# Patient Record
Sex: FEMALE | Race: WHITE | Employment: UNEMPLOYED | URBAN - METROPOLITAN AREA
[De-identification: names, ages, dates, MRNs, and addresses within clinical notes are randomized per-mention and may not be internally consistent; named-entity substitution may affect disease eponyms.]

---

## 2018-06-17 PROBLEM — G93.40 ENCEPHALOPATHY ACUTE: Status: ACTIVE | Noted: 2018-06-17

## 2018-06-19 PROBLEM — R56.9 PARTIAL SEIZURE (HCC): Status: ACTIVE | Noted: 2018-06-19

## 2018-06-19 PROBLEM — G40.909 SEIZURE DISORDER (HCC): Status: ACTIVE | Noted: 2018-06-19

## 2018-06-19 PROBLEM — Z76.5 DRUG-SEEKING BEHAVIOR: Status: ACTIVE | Noted: 2018-06-19

## 2018-06-19 PROBLEM — G89.4 CHRONIC PAIN SYNDROME: Status: ACTIVE | Noted: 2018-06-19

## 2018-06-19 PROBLEM — G93.41 ACUTE METABOLIC ENCEPHALOPATHY: Status: ACTIVE | Noted: 2018-06-19

## 2018-06-19 PROBLEM — Z86.61 HISTORY OF MENINGITIS: Status: ACTIVE | Noted: 2018-06-19

## 2018-06-19 PROBLEM — F13.20 BENZODIAZEPINE DEPENDENCE, CONTINUOUS (HCC): Status: ACTIVE | Noted: 2018-06-19

## 2018-12-09 ENCOUNTER — APPOINTMENT (OUTPATIENT)
Dept: CT IMAGING | Age: 43
End: 2018-12-09
Payer: COMMERCIAL

## 2018-12-09 ENCOUNTER — APPOINTMENT (OUTPATIENT)
Dept: GENERAL RADIOLOGY | Age: 43
End: 2018-12-09
Payer: COMMERCIAL

## 2018-12-09 ENCOUNTER — HOSPITAL ENCOUNTER (EMERGENCY)
Age: 43
Discharge: OTHER INSTITUTION | End: 2018-12-09
Attending: EMERGENCY MEDICINE
Payer: COMMERCIAL

## 2018-12-09 VITALS
HEART RATE: 79 BPM | DIASTOLIC BLOOD PRESSURE: 48 MMHG | RESPIRATION RATE: 15 BRPM | TEMPERATURE: 96.8 F | SYSTOLIC BLOOD PRESSURE: 113 MMHG | OXYGEN SATURATION: 96 %

## 2018-12-09 DIAGNOSIS — S62.666A CLOSED NONDISPLACED FRACTURE OF DISTAL PHALANX OF RIGHT LITTLE FINGER, INITIAL ENCOUNTER: ICD-10-CM

## 2018-12-09 DIAGNOSIS — S61.411A LACERATION OF RIGHT HAND WITHOUT FOREIGN BODY, INITIAL ENCOUNTER: ICD-10-CM

## 2018-12-09 DIAGNOSIS — R56.9 SEIZURE (HCC): ICD-10-CM

## 2018-12-09 DIAGNOSIS — N30.00 ACUTE CYSTITIS WITHOUT HEMATURIA: ICD-10-CM

## 2018-12-09 DIAGNOSIS — G40.919 BREAKTHROUGH SEIZURE (HCC): ICD-10-CM
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
## 2018-12-09 DIAGNOSIS — R91.8 PULMONARY NODULES: ICD-10-CM

## 2018-12-09 DIAGNOSIS — I71.20 THORACIC AORTIC ANEURYSM WITHOUT RUPTURE: ICD-10-CM

## 2018-12-09 LAB
AMPHETAMINE SCREEN, URINE: ABNORMAL
ANION GAP SERPL CALCULATED.3IONS-SCNC: 12 MMOL/L (ref 3–16)
BACTERIA: ABNORMAL /HPF
BARBITURATE SCREEN URINE: ABNORMAL
BENZODIAZEPINE SCREEN, URINE: ABNORMAL
BILIRUBIN URINE: NEGATIVE
BLOOD, URINE: ABNORMAL
BUN BLDV-MCNC: 15 MG/DL (ref 7–20)
CALCIUM SERPL-MCNC: 9.1 MG/DL (ref 8.3–10.6)
CANNABINOID SCREEN URINE: POSITIVE
CHLORIDE BLD-SCNC: 106 MMOL/L (ref 99–110)
CLARITY: ABNORMAL
CO2: 20 MMOL/L (ref 21–32)
COCAINE METABOLITE SCREEN URINE: ABNORMAL
COLOR: YELLOW
CREAT SERPL-MCNC: 1 MG/DL (ref 0.6–1.1)
EPITHELIAL CELLS, UA: ABNORMAL /HPF
ETHANOL: NORMAL MG/DL (ref 0–0.08)
GFR AFRICAN AMERICAN: >60
GFR NON-AFRICAN AMERICAN: >60
GLUCOSE BLD-MCNC: 74 MG/DL (ref 70–99)
GLUCOSE URINE: NEGATIVE MG/DL
HCG(URINE) PREGNANCY TEST: NEGATIVE
HCT VFR BLD CALC: 42.9 % (ref 36–48)
HEMOGLOBIN: 14.1 G/DL (ref 12–16)
KEPPRA DOSE AMT: ABNORMAL
KEPPRA: <2 UG/ML (ref 6–46)
KETONES, URINE: NEGATIVE MG/DL
LEUKOCYTE ESTERASE, URINE: ABNORMAL
Lab: ABNORMAL
MCH RBC QN AUTO: 32.4 PG (ref 26–34)
MCHC RBC AUTO-ENTMCNC: 32.8 G/DL (ref 31–36)
MCV RBC AUTO: 98.6 FL (ref 80–100)
METHADONE SCREEN, URINE: ABNORMAL
MICROSCOPIC EXAMINATION: YES
NITRITE, URINE: NEGATIVE
OPIATE SCREEN URINE: ABNORMAL
OXYCODONE URINE: POSITIVE
PDW BLD-RTO: 14.9 % (ref 12.4–15.4)
PH UA: 5
PH UA: 5
PHENCYCLIDINE SCREEN URINE: ABNORMAL
PLATELET # BLD: 292 K/UL (ref 135–450)
PMV BLD AUTO: 7 FL (ref 5–10.5)
POTASSIUM SERPL-SCNC: 3.8 MMOL/L (ref 3.5–5.1)
PROPOXYPHENE SCREEN: ABNORMAL
PROTEIN UA: NEGATIVE MG/DL
RBC # BLD: 4.35 M/UL (ref 4–5.2)
RBC UA: ABNORMAL /HPF (ref 0–2)
SODIUM BLD-SCNC: 138 MMOL/L (ref 136–145)
SPECIFIC GRAVITY UA: 1.01
URINE TYPE: ABNORMAL
UROBILINOGEN, URINE: 0.2 E.U./DL
WBC # BLD: 10.9 K/UL (ref 4–11)
WBC UA: ABNORMAL /HPF (ref 0–5)

## 2018-12-09 PROCEDURE — 71260 CT THORAX DX C+: CPT

## 2018-12-09 PROCEDURE — 80048 BASIC METABOLIC PNL TOTAL CA: CPT

## 2018-12-09 PROCEDURE — 80177 DRUG SCRN QUAN LEVETIRACETAM: CPT

## 2018-12-09 PROCEDURE — 96374 THER/PROPH/DIAG INJ IV PUSH: CPT

## 2018-12-09 PROCEDURE — 80307 DRUG TEST PRSMV CHEM ANLYZR: CPT

## 2018-12-09 PROCEDURE — 81001 URINALYSIS AUTO W/SCOPE: CPT

## 2018-12-09 PROCEDURE — 74177 CT ABD & PELVIS W/CONTRAST: CPT

## 2018-12-09 PROCEDURE — 72125 CT NECK SPINE W/O DYE: CPT

## 2018-12-09 PROCEDURE — 85027 COMPLETE CBC AUTOMATED: CPT

## 2018-12-09 PROCEDURE — 36415 COLL VENOUS BLD VENIPUNCTURE: CPT

## 2018-12-09 PROCEDURE — 93005 ELECTROCARDIOGRAM TRACING: CPT | Performed by: NURSE PRACTITIONER

## 2018-12-09 PROCEDURE — 73130 X-RAY EXAM OF HAND: CPT

## 2018-12-09 PROCEDURE — 70450 CT HEAD/BRAIN W/O DYE: CPT

## 2018-12-09 PROCEDURE — 6360000002 HC RX W HCPCS

## 2018-12-09 PROCEDURE — 6360000002 HC RX W HCPCS: Performed by: EMERGENCY MEDICINE

## 2018-12-09 PROCEDURE — 6360000004 HC RX CONTRAST MEDICATION: Performed by: EMERGENCY MEDICINE

## 2018-12-09 PROCEDURE — 84703 CHORIONIC GONADOTROPIN ASSAY: CPT

## 2018-12-09 PROCEDURE — G0480 DRUG TEST DEF 1-7 CLASSES: HCPCS

## 2018-12-09 PROCEDURE — 99285 EMERGENCY DEPT VISIT HI MDM: CPT

## 2018-12-09 RX ORDER — LORAZEPAM 2 MG/ML
INJECTION INTRAMUSCULAR
Status: COMPLETED
Start: 2018-12-09 | End: 2018-12-09

## 2018-12-09 RX ORDER — CIPROFLOXACIN 2 MG/ML
400 INJECTION, SOLUTION INTRAVENOUS ONCE
Status: COMPLETED | OUTPATIENT
Start: 2018-12-09 | End: 2018-12-09

## 2018-12-09 RX ORDER — MORPHINE SULFATE 4 MG/ML
4 INJECTION, SOLUTION INTRAMUSCULAR; INTRAVENOUS EVERY 30 MIN PRN
Status: DISCONTINUED | OUTPATIENT
Start: 2018-12-09 | End: 2018-12-09 | Stop reason: HOSPADM

## 2018-12-09 RX ADMIN — IOPAMIDOL 75 ML: 755 INJECTION, SOLUTION INTRAVENOUS at 06:27

## 2018-12-09 RX ADMIN — MORPHINE SULFATE 4 MG: 4 INJECTION INTRAVENOUS at 06:00

## 2018-12-09 RX ADMIN — LORAZEPAM 1 MG: 2 INJECTION, SOLUTION INTRAMUSCULAR; INTRAVENOUS at 02:30

## 2018-12-09 RX ADMIN — MORPHINE SULFATE 4 MG: 4 INJECTION INTRAVENOUS at 06:39

## 2018-12-09 RX ADMIN — CIPROFLOXACIN 400 MG: 2 INJECTION, SOLUTION INTRAVENOUS at 07:46

## 2018-12-09 ASSESSMENT — PAIN SCALES - GENERAL
PAINLEVEL_OUTOF10: 10
PAINLEVEL_OUTOF10: 9

## 2018-12-09 NOTE — ED PROVIDER NOTES
History Narrative    No narrative on file     Current Facility-Administered Medications   Medication Dose Route Frequency Provider Last Rate Last Dose    morphine (PF) injection 4 mg  4 mg Intravenous Q30 Min PRN [place] Sites, DO   4 mg at 12/09/18 7586    ciprofloxacin (CIPRO) IVPB 400 mg  400 mg Intravenous Once [place] Sites, DO         Current Outpatient Prescriptions   Medication Sig Dispense Refill    naproxen (NAPROSYN) 500 MG tablet Take 1 tablet by mouth 2 times daily as needed for Pain 20 tablet 0    citalopram (CELEXA) 10 MG tablet Take 10 mg by mouth daily      topiramate (TOPAMAX) 50 MG tablet Take 100 mg by mouth 2 times daily       alprazolam (XANAX) 0.25 MG tablet Take 0.25 mg by mouth 3 times daily as needed.  gabapentin (NEURONTIN) 800 MG tablet Take 600 mg by mouth 3 times daily.  lamotrigine (LAMICTAL) 200 MG tablet Take 200 mg by mouth See Admin Instructions 200 mg in am , 250 mg at night       Allergies   Allergen Reactions    Fioricet [Butalbital-Apap-Caffeine]      \"CANT BREATHE\"    Phenytoin Sodium Extended      DOESN'T REMEMBER REACTION    Reglan [Metoclopramide] Hives and Itching    Toradol [Ketorolac Tromethamine]      Pt states it makes the headache pain worse rather than better    Erythromycin Rash    Penicillins Rash       REVIEW OF SYSTEMS  Review of Systems  10 systems reviewed, pertinent positives perHPI otherwise noted to be negative. PHYSICAL EXAM  /71   Pulse 80   Temp 96.8 °F (36 °C) (Oral)   Resp 13   SpO2 99%     GENERAL APPEARANCE: very anxious must be redirected multiple times no acute distress, nontoxic and non-ill-appearing, fully developed well-nourished adult appears discheveled and older then stated age. HEAD: Normocephalic. Atraumatic. EYES: EOM's grossly intact. ENT: Mucous membranes are moist.   NECK: Supple. Full Range of motion  HEART: RRR. No murmurs  LUNGS:  Lungs are cta. ABDOMEN: Soft. Non-distended.  Normal bowel sounds. No organomegaly. Non-tender  EXTREMITIES: No peripheral edema. Moves all extremities equally. No gross deformities of the upperor lower extremities. Small abrasion to right middle finger no active bleeding  SKIN: Warm and dry. No acute rashes. NEUROLOGICAL: Alert and oriented. Cranial nerves II-12 are  intact, no focal neurologic deficits . PSYCHIATRIC: Normal mood and affect. Physical Exam    LABS  I have reviewed all labs for this visit.    Results for orders placed or performed during the hospital encounter of 12/09/18   CBC   Result Value Ref Range    WBC 10.9 4.0 - 11.0 K/uL    RBC 4.35 4.00 - 5.20 M/uL    Hemoglobin 14.1 12.0 - 16.0 g/dL    Hematocrit 42.9 36.0 - 48.0 %    MCV 98.6 80.0 - 100.0 fL    MCH 32.4 26.0 - 34.0 pg    MCHC 32.8 31.0 - 36.0 g/dL    RDW 14.9 12.4 - 15.4 %    Platelets 241 229 - 481 K/uL    MPV 7.0 5.0 - 10.5 fL   Basic Metabolic Panel   Result Value Ref Range    Sodium 138 136 - 145 mmol/L    Potassium 3.8 3.5 - 5.1 mmol/L    Chloride 106 99 - 110 mmol/L    CO2 20 (L) 21 - 32 mmol/L    Anion Gap 12 3 - 16    Glucose 74 70 - 99 mg/dL    BUN 15 7 - 20 mg/dL    CREATININE 1.0 0.6 - 1.1 mg/dL    GFR Non-African American >60 >60    GFR African American >60 >60    Calcium 9.1 8.3 - 10.6 mg/dL   Ethanol   Result Value Ref Range    Ethanol Lvl None Detected mg/dL   Levetiracetam Level   Result Value Ref Range    KEPPRA Dose Amt Unknown    Pregnancy, Urine   Result Value Ref Range    HCG(Urine) Pregnancy Test Negative Detects HCG level >20 MIU/mL   Urine Drug Screen   Result Value Ref Range    Amphetamine Screen, Urine Neg Negative <1000ng/mL    Barbiturate Screen, Ur Neg Negative <200 ng/mL    Benzodiazepine Screen, Urine Neg Negative <200 ng/mL    Cannabinoid Scrn, Ur POSITIVE (A) Negative <50 ng/mL    Cocaine Metabolite Screen, Urine Neg Negative <300 ng/mL    Opiate Scrn, Ur Neg Negative <300 ng/mL    PCP Screen, Urine Neg Negative <25 ng/mL    Methadone Screen, Urine Neg Negative <300 ng/mL    Propoxyphene Scrn, Ur Neg Negative <300 ng/mL    pH, UA 5.0     Drug Screen Comment: see below     Oxycodone Urine POSITIVE (A) Negative <100 ng/ml   Urinalysis   Result Value Ref Range    Color, UA YELLOW Straw/Yellow    Clarity, UA CLOUDY (A) Clear    Glucose, Ur Negative Negative mg/dL    Bilirubin Urine Negative Negative    Ketones, Urine Negative Negative mg/dL    Specific Gravity, UA 1.010 1.005 - 1.030    Blood, Urine TRACE (A) Negative    pH, UA 5.0 5.0 - 8.0    Protein, UA Negative Negative mg/dL    Urobilinogen, Urine 0.2 <2.0 E.U./dL    Nitrite, Urine Negative Negative    Leukocyte Esterase, Urine LARGE (A) Negative    Microscopic Examination YES     Urine Type Not Specified    Microscopic Urinalysis   Result Value Ref Range    WBC, UA 3-5 0 - 5 /HPF    RBC, UA 0-2 0 - 2 /HPF    Epi Cells 5-10 /HPF    Bacteria, UA Rare (A) /HPF       EKG  [unfilled]    RADIOLOGY  XR HAND RIGHT (MIN 3 VIEWS)   Final Result   Fracture of the distal phalanx of the right 4th digit. CT CHEST W CONTRAST   Final Result   No evidence of acute traumatic injury. Aneurysmal dilatation of the ascending aorta to approximately 4 cm. Emphysema. A few small noncalcified pulmonary nodules are also seen   measuring up to approximately 5 mm. Follow-up recommendations are as below. RECOMMENDATIONS:   Fleischner Society guidelines for follow-up and management of incidentally   detected pulmonary nodules:      Single Solid Nodule:      Nodule size less than 6 mm   In a low-risk patient, no routine follow-up. In a high-risk patient, optional CT at 12 months. Nodule size equals 6-8 mm   In a low-risk patient, CT at 6-12 months, then consider CT at 18-24 months. In a high-risk patient, CT at 6-12 months, then CT at 18-24 months. Nodule size greater than 8 mm         In a low-risk patient, consider CT at 3 months, PET/CT, or tissue sampling.       In a high-risk patient, consider CT at 3 months, PET/CT, or tissue sampling. Multiple Solid Nodules:      Nodule size less than 6 mm   In a low-risk patient, no routine follow-up. In a high-risk patient, optional CT at 12 months. Nodule size equals 6-8 mm   In a low-risk patient, CT at 3-6 months, then consider CT at 18-24 months. In a high-risk patient, CT at 3-6 months, then CT at 18-24 months. Nodule size greater than 8 mm   In a low-risk patient, CT at 3-6 months, then consider CT at 18-24 months. In a high-risk patient, CT at 3-6 months, then CT at 18-24 months. - Low risk patients include individuals with minimal or absent history of   smoking and other known risk factors. - High risk patients include individuals with a history or smoking or known   risk factors. Radiology 2017 http://pubs. rsna.org/doi/full/10.1148/radiol. 3300289839         CT ABDOMEN PELVIS W IV CONTRAST Additional Contrast? None   Final Result   No evidence of acute traumatic injury. Aneurysmal dilatation of the ascending aorta to approximately 4 cm. Emphysema. A few small noncalcified pulmonary nodules are also seen   measuring up to approximately 5 mm. Follow-up recommendations are as below. RECOMMENDATIONS:   Fleischner Society guidelines for follow-up and management of incidentally   detected pulmonary nodules:      Single Solid Nodule:      Nodule size less than 6 mm   In a low-risk patient, no routine follow-up. In a high-risk patient, optional CT at 12 months. Nodule size equals 6-8 mm   In a low-risk patient, CT at 6-12 months, then consider CT at 18-24 months. In a high-risk patient, CT at 6-12 months, then CT at 18-24 months. Nodule size greater than 8 mm         In a low-risk patient, consider CT at 3 months, PET/CT, or tissue sampling. In a high-risk patient, consider CT at 3 months, PET/CT, or tissue sampling.       Multiple Solid Nodules:      Nodule size less than 6 mm   In a low-risk patient, no routine follow-up. In a high-risk patient, optional CT at 12 months. Nodule size equals 6-8 mm   In a low-risk patient, CT at 3-6 months, then consider CT at 18-24 months. In a high-risk patient, CT at 3-6 months, then CT at 18-24 months. Nodule size greater than 8 mm   In a low-risk patient, CT at 3-6 months, then consider CT at 18-24 months. In a high-risk patient, CT at 3-6 months, then CT at 18-24 months. - Low risk patients include individuals with minimal or absent history of   smoking and other known risk factors. - High risk patients include individuals with a history or smoking or known   risk factors. Radiology 2017 http://pubs. rsna.org/doi/full/10.1148/radiol. 3879845566         CT HEAD WO CONTRAST    (Results Pending)   CT CERVICAL SPINE WO CONTRAST    (Results Pending)           PROCEDURES    ED COURSE/MDM  Patient was triaged, vital signs are taken and EKG was performed that shows a normal sinus rhythm there is no acute ST elevation acute T-wave inversion rate of 99  QRS 74  QTc of 454. Compared to prior EKG August 2018 that showed similar findings. Patient had CT imaging performed of the head neck chest abdomen pelvis that she had tenderness with palpation throughout the thoracic spine  She is found to be positive for cannabis and opiates on urine drug screen. She was given 1 mg of Ativan to prevent further seizures and secondary to severe anxiety. She was started on medications for pain and symptom control. She signed out of that a aneurysm of the descending thoracic aorta. She is discussed with the on-call trauma team at [facility] in the emergency department physician Dr. Felisha Brock and agreed to accept the patient in transfer which is otherwise arranged she started on antibiotics for urinary tract infection. Finger fracture was noted and the patient was placed into a splint. Old records reviewed.  Labs and imaging reviewed and results

## 2018-12-09 NOTE — ED NOTES
Bed: 05  Expected date:   Expected time:   Means of arrival:   Comments:  FF [address] [facility], [facility]  12/09/18 9417

## 2018-12-09 NOTE — ED NOTES
Pt placed in a [place] collar. Pt verbalizes understanding of necessity. Pt remains tearful.       Micky Jara RN  12/09/18 4799

## 2018-12-10 LAB
EKG ATRIAL RATE: 99 BPM
EKG DIAGNOSIS: NORMAL
EKG P AXIS: 76 DEGREES
EKG P-R INTERVAL: 148 MS
EKG Q-T INTERVAL: 354 MS
EKG QRS DURATION: 74 MS
EKG QTC CALCULATION (BAZETT): 454 MS
EKG R AXIS: 85 DEGREES
EKG T AXIS: 59 DEGREES
EKG VENTRICULAR RATE: 99 BPM

## 2018-12-10 PROCEDURE — 93010 ELECTROCARDIOGRAM REPORT: CPT | Performed by: INTERNAL MEDICINE

## 2019-02-25 ENCOUNTER — HOSPITAL ENCOUNTER (EMERGENCY)
Age: 44
Discharge: HOME | End: 2019-02-25
Payer: MEDICAID

## 2019-02-25 VITALS
WEIGHT: 120 LBS | HEIGHT: 60 IN | BODY MASS INDEX: 23.56 KG/M2 | OXYGEN SATURATION: 97 % | RESPIRATION RATE: 20 BRPM | DIASTOLIC BLOOD PRESSURE: 82 MMHG | TEMPERATURE: 98.2 F | HEART RATE: 95 BPM | SYSTOLIC BLOOD PRESSURE: 108 MMHG

## 2019-02-25 DIAGNOSIS — F41.0 PANIC ATTACK: Primary | ICD-10-CM

## 2019-02-25 PROCEDURE — 99283 EMERGENCY DEPT VISIT LOW MDM: CPT

## 2019-02-25 PROCEDURE — 6370000000 HC RX 637 (ALT 250 FOR IP): Performed by: NURSE PRACTITIONER

## 2019-02-25 RX ORDER — LORAZEPAM 1 MG/1
1 TABLET ORAL ONCE
Status: COMPLETED | OUTPATIENT
Start: 2019-02-25 | End: 2019-02-25

## 2019-02-25 RX ORDER — NAPROXEN 250 MG/1
500 TABLET ORAL ONCE
Status: COMPLETED | OUTPATIENT
Start: 2019-02-25 | End: 2019-02-25

## 2019-02-25 RX ORDER — HYDROXYZINE PAMOATE 25 MG/1
50 CAPSULE ORAL ONCE
Status: COMPLETED | OUTPATIENT
Start: 2019-02-25 | End: 2019-02-25

## 2019-02-25 RX ORDER — KETOROLAC TROMETHAMINE 30 MG/ML
30 INJECTION, SOLUTION INTRAMUSCULAR; INTRAVENOUS ONCE
Status: DISCONTINUED | OUTPATIENT
Start: 2019-02-25 | End: 2019-02-25

## 2019-02-25 RX ORDER — ACETAMINOPHEN 500 MG
1000 TABLET ORAL ONCE
Status: DISCONTINUED | OUTPATIENT
Start: 2019-02-25 | End: 2019-02-25

## 2019-02-25 RX ORDER — HYDROCODONE BITARTRATE AND ACETAMINOPHEN 7.5; 325 MG/1; MG/1
1 TABLET ORAL 3 TIMES DAILY
COMMUNITY

## 2019-02-25 RX ADMIN — NAPROXEN 500 MG: 250 TABLET ORAL at 18:05

## 2019-02-25 RX ADMIN — HYDROXYZINE PAMOATE 50 MG: 25 CAPSULE ORAL at 14:52

## 2019-02-25 RX ADMIN — LORAZEPAM 1 MG: 1 TABLET ORAL at 14:52

## 2019-02-25 ASSESSMENT — ENCOUNTER SYMPTOMS
NAUSEA: 0
SHORTNESS OF BREATH: 0
CONSTIPATION: 0
ABDOMINAL PAIN: 0
VOMITING: 0
SORE THROAT: 0
BLOOD IN STOOL: 0
DIARRHEA: 0
RHINORRHEA: 0

## 2019-02-25 ASSESSMENT — PAIN SCALES - GENERAL
PAINLEVEL_OUTOF10: 6
PAINLEVEL_OUTOF10: 6

## 2019-02-25 ASSESSMENT — PAIN DESCRIPTION - LOCATION
LOCATION: HEAD
LOCATION: HEAD

## 2019-02-25 ASSESSMENT — PAIN DESCRIPTION - PAIN TYPE: TYPE: CHRONIC PAIN

## 2019-04-30 ENCOUNTER — APPOINTMENT (OUTPATIENT)
Dept: GENERAL RADIOLOGY | Age: 44
End: 2019-04-30
Payer: COMMERCIAL

## 2019-04-30 ENCOUNTER — APPOINTMENT (OUTPATIENT)
Dept: CT IMAGING | Age: 44
End: 2019-04-30
Payer: COMMERCIAL

## 2019-04-30 ENCOUNTER — HOSPITAL ENCOUNTER (OUTPATIENT)
Age: 44
Setting detail: OBSERVATION
Discharge: HOME | End: 2019-05-02
Attending: EMERGENCY MEDICINE | Admitting: HOSPITALIST
Payer: COMMERCIAL

## 2019-04-30 DIAGNOSIS — F19.10 POLYSUBSTANCE ABUSE (HCC): Primary | ICD-10-CM

## 2019-04-30 DIAGNOSIS — E87.20 METABOLIC ACIDOSIS: ICD-10-CM

## 2019-04-30 DIAGNOSIS — R45.851 SUICIDAL IDEATION: ICD-10-CM

## 2019-04-30 DIAGNOSIS — F19.951 HALLUCINATION, DRUG-INDUCED (HCC): ICD-10-CM

## 2019-04-30 DIAGNOSIS — E16.2 HYPOGLYCEMIA: ICD-10-CM

## 2019-04-30 DIAGNOSIS — R40.4 TRANSIENT ALTERATION OF AWARENESS: ICD-10-CM

## 2019-04-30 PROBLEM — F15.929 METHAMPHETAMINE INTOXICATION (HCC): Status: ACTIVE | Noted: 2019-04-30

## 2019-04-30 LAB
A/G RATIO: 0.9 (ref 1.1–2.2)
ACETAMINOPHEN LEVEL: <5 UG/ML (ref 10–30)
ALBUMIN SERPL-MCNC: 3.3 G/DL (ref 3.4–5)
ALP BLD-CCNC: 109 U/L (ref 40–129)
ALT SERPL-CCNC: 21 U/L (ref 10–40)
AMPHETAMINE SCREEN, URINE: POSITIVE
ANION GAP SERPL CALCULATED.3IONS-SCNC: 12 MMOL/L (ref 3–16)
ANION GAP SERPL CALCULATED.3IONS-SCNC: 18 MMOL/L (ref 3–16)
AST SERPL-CCNC: 23 U/L (ref 15–37)
BACTERIA: ABNORMAL /HPF
BARBITURATE SCREEN URINE: ABNORMAL
BASE EXCESS VENOUS: -5.6 MMOL/L (ref -3–3)
BASOPHILS ABSOLUTE: 0 K/UL (ref 0–0.2)
BASOPHILS RELATIVE PERCENT: 0.5 %
BENZODIAZEPINE SCREEN, URINE: ABNORMAL
BILIRUB SERPL-MCNC: 1.8 MG/DL (ref 0–1)
BILIRUBIN URINE: ABNORMAL
BLOOD, URINE: NEGATIVE
BUN BLDV-MCNC: 12 MG/DL (ref 7–20)
BUN BLDV-MCNC: 14 MG/DL (ref 7–20)
CALCIUM SERPL-MCNC: 7.6 MG/DL (ref 8.3–10.6)
CALCIUM SERPL-MCNC: 8.2 MG/DL (ref 8.3–10.6)
CANNABINOID SCREEN URINE: ABNORMAL
CARBOXYHEMOGLOBIN: 4.7 % (ref 0–1.5)
CHLORIDE BLD-SCNC: 105 MMOL/L (ref 99–110)
CHLORIDE BLD-SCNC: 111 MMOL/L (ref 99–110)
CHP ED QC CHECK: YES
CHP ED QC CHECK: YES
CLARITY: ABNORMAL
CO2: 14 MMOL/L (ref 21–32)
CO2: 16 MMOL/L (ref 21–32)
COCAINE METABOLITE SCREEN URINE: POSITIVE
COLOR: YELLOW
CREAT SERPL-MCNC: 0.7 MG/DL (ref 0.6–1.1)
CREAT SERPL-MCNC: 1 MG/DL (ref 0.6–1.1)
EOSINOPHILS ABSOLUTE: 0 K/UL (ref 0–0.6)
EOSINOPHILS RELATIVE PERCENT: 0.1 %
EPITHELIAL CELLS, UA: 7 /HPF (ref 0–5)
ETHANOL: NORMAL MG/DL (ref 0–0.08)
GFR AFRICAN AMERICAN: >60
GFR AFRICAN AMERICAN: >60
GFR NON-AFRICAN AMERICAN: >60
GFR NON-AFRICAN AMERICAN: >60
GLOBULIN: 3.5 G/DL
GLUCOSE BLD-MCNC: 118 MG/DL
GLUCOSE BLD-MCNC: 118 MG/DL (ref 70–99)
GLUCOSE BLD-MCNC: 58 MG/DL
GLUCOSE BLD-MCNC: 58 MG/DL (ref 70–99)
GLUCOSE BLD-MCNC: 63 MG/DL (ref 70–99)
GLUCOSE BLD-MCNC: 64 MG/DL (ref 70–99)
GLUCOSE BLD-MCNC: 68 MG/DL (ref 70–99)
GLUCOSE BLD-MCNC: 81 MG/DL (ref 70–99)
GLUCOSE BLD-MCNC: 99 MG/DL (ref 70–99)
GLUCOSE URINE: NEGATIVE MG/DL
HCG QUALITATIVE: NEGATIVE
HCG(URINE) PREGNANCY TEST: NEGATIVE
HCO3 VENOUS: 15.8 MMOL/L (ref 23–29)
HCT VFR BLD CALC: 34.7 % (ref 36–48)
HEMOGLOBIN: 11.5 G/DL (ref 12–16)
HYALINE CASTS: 10 /LPF (ref 0–8)
KETONES, URINE: 15 MG/DL
LEUKOCYTE ESTERASE, URINE: NEGATIVE
LYMPHOCYTES ABSOLUTE: 1.6 K/UL (ref 1–5.1)
LYMPHOCYTES RELATIVE PERCENT: 18.1 %
Lab: ABNORMAL
MAGNESIUM: 2 MG/DL (ref 1.8–2.4)
MCH RBC QN AUTO: 32.9 PG (ref 26–34)
MCHC RBC AUTO-ENTMCNC: 33.2 G/DL (ref 31–36)
MCV RBC AUTO: 99.2 FL (ref 80–100)
METHADONE SCREEN, URINE: ABNORMAL
METHEMOGLOBIN VENOUS: 0.3 %
MICROSCOPIC EXAMINATION: YES
MONOCYTES ABSOLUTE: 0.8 K/UL (ref 0–1.3)
MONOCYTES RELATIVE PERCENT: 9 %
NEUTROPHILS ABSOLUTE: 6.3 K/UL (ref 1.7–7.7)
NEUTROPHILS RELATIVE PERCENT: 72.3 %
NITRITE, URINE: NEGATIVE
O2 CONTENT, VEN: 15 VOL %
O2 SAT, VEN: 100 %
O2 THERAPY: ABNORMAL
OPIATE SCREEN URINE: ABNORMAL
OXYCODONE URINE: POSITIVE
PCO2, VEN: 19.9 MMHG (ref 40–50)
PDW BLD-RTO: 21.8 % (ref 12.4–15.4)
PERFORMED ON: ABNORMAL
PERFORMED ON: NORMAL
PERFORMED ON: NORMAL
PH UA: 5.5
PH UA: 5.5 (ref 5–8)
PH VENOUS: 7.51 (ref 7.35–7.45)
PHENCYCLIDINE SCREEN URINE: ABNORMAL
PLATELET # BLD: 337 K/UL (ref 135–450)
PMV BLD AUTO: 7.7 FL (ref 5–10.5)
PO2, VEN: 142 MMHG (ref 25–40)
POTASSIUM REFLEX MAGNESIUM: 3.4 MMOL/L (ref 3.5–5.1)
POTASSIUM REFLEX MAGNESIUM: 3.6 MMOL/L (ref 3.5–5.1)
PROPOXYPHENE SCREEN: ABNORMAL
PROTEIN UA: 100 MG/DL
RBC # BLD: 3.5 M/UL (ref 4–5.2)
RBC UA: 2 /HPF (ref 0–4)
SALICYLATE, SERUM: 8.8 MG/DL (ref 15–30)
SODIUM BLD-SCNC: 137 MMOL/L (ref 136–145)
SODIUM BLD-SCNC: 139 MMOL/L (ref 136–145)
SPECIFIC GRAVITY UA: 1.01 (ref 1–1.03)
TCO2 CALC VENOUS: 37 MMOL/L
TOTAL CK: 96 U/L (ref 26–192)
TOTAL PROTEIN: 6.8 G/DL (ref 6.4–8.2)
URINE REFLEX TO CULTURE: YES
URINE TYPE: ABNORMAL
UROBILINOGEN, URINE: 1 E.U./DL
WBC # BLD: 8.7 K/UL (ref 4–11)
WBC UA: 13 /HPF (ref 0–5)

## 2019-04-30 PROCEDURE — 85025 COMPLETE CBC W/AUTO DIFF WBC: CPT

## 2019-04-30 PROCEDURE — 2060000000 HC ICU INTERMEDIATE R&B

## 2019-04-30 PROCEDURE — 6360000002 HC RX W HCPCS: Performed by: EMERGENCY MEDICINE

## 2019-04-30 PROCEDURE — 96375 TX/PRO/DX INJ NEW DRUG ADDON: CPT

## 2019-04-30 PROCEDURE — 6370000000 HC RX 637 (ALT 250 FOR IP): Performed by: HOSPITALIST

## 2019-04-30 PROCEDURE — 71045 X-RAY EXAM CHEST 1 VIEW: CPT

## 2019-04-30 PROCEDURE — 80307 DRUG TEST PRSMV CHEM ANLYZR: CPT

## 2019-04-30 PROCEDURE — G0378 HOSPITAL OBSERVATION PER HR: HCPCS

## 2019-04-30 PROCEDURE — G0480 DRUG TEST DEF 1-7 CLASSES: HCPCS

## 2019-04-30 PROCEDURE — 99285 EMERGENCY DEPT VISIT HI MDM: CPT

## 2019-04-30 PROCEDURE — 6360000002 HC RX W HCPCS: Performed by: HOSPITALIST

## 2019-04-30 PROCEDURE — 87086 URINE CULTURE/COLONY COUNT: CPT

## 2019-04-30 PROCEDURE — 96376 TX/PRO/DX INJ SAME DRUG ADON: CPT

## 2019-04-30 PROCEDURE — 82550 ASSAY OF CK (CPK): CPT

## 2019-04-30 PROCEDURE — 96361 HYDRATE IV INFUSION ADD-ON: CPT

## 2019-04-30 PROCEDURE — 2580000003 HC RX 258: Performed by: EMERGENCY MEDICINE

## 2019-04-30 PROCEDURE — 93005 ELECTROCARDIOGRAM TRACING: CPT | Performed by: NURSE PRACTITIONER

## 2019-04-30 PROCEDURE — 96374 THER/PROPH/DIAG INJ IV PUSH: CPT

## 2019-04-30 PROCEDURE — 6360000002 HC RX W HCPCS: Performed by: NURSE PRACTITIONER

## 2019-04-30 PROCEDURE — 81001 URINALYSIS AUTO W/SCOPE: CPT

## 2019-04-30 PROCEDURE — 70450 CT HEAD/BRAIN W/O DYE: CPT

## 2019-04-30 PROCEDURE — 2580000003 HC RX 258: Performed by: HOSPITALIST

## 2019-04-30 PROCEDURE — 96366 THER/PROPH/DIAG IV INF ADDON: CPT

## 2019-04-30 PROCEDURE — 36415 COLL VENOUS BLD VENIPUNCTURE: CPT

## 2019-04-30 PROCEDURE — 83735 ASSAY OF MAGNESIUM: CPT

## 2019-04-30 PROCEDURE — 2500000003 HC RX 250 WO HCPCS: Performed by: HOSPITALIST

## 2019-04-30 PROCEDURE — 84703 CHORIONIC GONADOTROPIN ASSAY: CPT

## 2019-04-30 PROCEDURE — 2580000003 HC RX 258: Performed by: NURSE PRACTITIONER

## 2019-04-30 PROCEDURE — 80053 COMPREHEN METABOLIC PANEL: CPT

## 2019-04-30 PROCEDURE — 82803 BLOOD GASES ANY COMBINATION: CPT

## 2019-04-30 PROCEDURE — 96365 THER/PROPH/DIAG IV INF INIT: CPT

## 2019-04-30 RX ORDER — 0.9 % SODIUM CHLORIDE 0.9 %
1000 INTRAVENOUS SOLUTION INTRAVENOUS ONCE
Status: DISCONTINUED | OUTPATIENT
Start: 2019-04-30 | End: 2019-04-30

## 2019-04-30 RX ORDER — LORAZEPAM 2 MG/ML
1 INJECTION INTRAMUSCULAR ONCE
Status: COMPLETED | OUTPATIENT
Start: 2019-04-30 | End: 2019-04-30

## 2019-04-30 RX ORDER — SODIUM CHLORIDE, SODIUM LACTATE, POTASSIUM CHLORIDE, CALCIUM CHLORIDE 600; 310; 30; 20 MG/100ML; MG/100ML; MG/100ML; MG/100ML
INJECTION, SOLUTION INTRAVENOUS ONCE
Status: COMPLETED | OUTPATIENT
Start: 2019-04-30 | End: 2019-04-30

## 2019-04-30 RX ORDER — 0.9 % SODIUM CHLORIDE 0.9 %
1000 INTRAVENOUS SOLUTION INTRAVENOUS ONCE
Status: COMPLETED | OUTPATIENT
Start: 2019-04-30 | End: 2019-04-30

## 2019-04-30 RX ORDER — ONDANSETRON 2 MG/ML
4 INJECTION INTRAMUSCULAR; INTRAVENOUS EVERY 6 HOURS PRN
Status: DISCONTINUED | OUTPATIENT
Start: 2019-04-30 | End: 2019-05-02 | Stop reason: HOSPADM

## 2019-04-30 RX ORDER — HYDROCODONE BITARTRATE AND ACETAMINOPHEN 7.5; 325 MG/1; MG/1
1 TABLET ORAL 3 TIMES DAILY
Status: DISCONTINUED | OUTPATIENT
Start: 2019-04-30 | End: 2019-05-02 | Stop reason: HOSPADM

## 2019-04-30 RX ORDER — ALPRAZOLAM 0.25 MG/1
0.25 TABLET ORAL 3 TIMES DAILY PRN
Status: DISCONTINUED | OUTPATIENT
Start: 2019-04-30 | End: 2019-05-02 | Stop reason: HOSPADM

## 2019-04-30 RX ORDER — SODIUM CHLORIDE 0.9 % (FLUSH) 0.9 %
10 SYRINGE (ML) INJECTION EVERY 12 HOURS SCHEDULED
Status: DISCONTINUED | OUTPATIENT
Start: 2019-04-30 | End: 2019-05-02 | Stop reason: HOSPADM

## 2019-04-30 RX ORDER — CYCLOBENZAPRINE HCL 10 MG
TABLET ORAL
Refills: 1 | COMMUNITY
Start: 2019-04-29

## 2019-04-30 RX ORDER — BUTORPHANOL TARTRATE 10 MG/ML
SPRAY, METERED NASAL
Refills: 0 | COMMUNITY
Start: 2019-04-10

## 2019-04-30 RX ORDER — GABAPENTIN 600 MG/1
TABLET ORAL
Refills: 2 | COMMUNITY
Start: 2019-04-20

## 2019-04-30 RX ORDER — GABAPENTIN 300 MG/1
600 CAPSULE ORAL 3 TIMES DAILY
Status: DISCONTINUED | OUTPATIENT
Start: 2019-04-30 | End: 2019-05-02 | Stop reason: HOSPADM

## 2019-04-30 RX ORDER — SODIUM CHLORIDE 0.9 % (FLUSH) 0.9 %
10 SYRINGE (ML) INJECTION PRN
Status: DISCONTINUED | OUTPATIENT
Start: 2019-04-30 | End: 2019-05-02 | Stop reason: HOSPADM

## 2019-04-30 RX ORDER — DEXTROSE, SODIUM CHLORIDE, AND POTASSIUM CHLORIDE 5; .45; .15 G/100ML; G/100ML; G/100ML
INJECTION INTRAVENOUS CONTINUOUS
Status: DISCONTINUED | OUTPATIENT
Start: 2019-04-30 | End: 2019-05-01

## 2019-04-30 RX ORDER — MIDAZOLAM HYDROCHLORIDE 1 MG/ML
4 INJECTION INTRAMUSCULAR; INTRAVENOUS ONCE
Status: COMPLETED | OUTPATIENT
Start: 2019-04-30 | End: 2019-04-30

## 2019-04-30 RX ORDER — LORAZEPAM 2 MG/ML
1 INJECTION INTRAMUSCULAR EVERY 6 HOURS PRN
Status: DISCONTINUED | OUTPATIENT
Start: 2019-04-30 | End: 2019-05-01

## 2019-04-30 RX ADMIN — SODIUM CHLORIDE 1000 ML: 9 INJECTION, SOLUTION INTRAVENOUS at 10:00

## 2019-04-30 RX ADMIN — MIDAZOLAM 4 MG: 1 INJECTION INTRAMUSCULAR; INTRAVENOUS at 14:14

## 2019-04-30 RX ADMIN — Medication 10 ML: at 23:15

## 2019-04-30 RX ADMIN — POTASSIUM CHLORIDE, DEXTROSE MONOHYDRATE AND SODIUM CHLORIDE: 150; 5; 450 INJECTION, SOLUTION INTRAVENOUS at 22:43

## 2019-04-30 RX ADMIN — LORAZEPAM 1 MG: 2 INJECTION INTRAMUSCULAR; INTRAVENOUS at 23:14

## 2019-04-30 RX ADMIN — HYDROCODONE BITARTRATE AND ACETAMINOPHEN 1 TABLET: 7.5; 325 TABLET ORAL at 23:13

## 2019-04-30 RX ADMIN — LORAZEPAM 1 MG: 2 INJECTION INTRAMUSCULAR; INTRAVENOUS at 10:00

## 2019-04-30 RX ADMIN — GABAPENTIN 600 MG: 300 CAPSULE ORAL at 23:14

## 2019-04-30 RX ADMIN — SODIUM CHLORIDE, POTASSIUM CHLORIDE, SODIUM LACTATE AND CALCIUM CHLORIDE: 600; 310; 30; 20 INJECTION, SOLUTION INTRAVENOUS at 14:14

## 2019-04-30 ASSESSMENT — ENCOUNTER SYMPTOMS
DIARRHEA: 0
NAUSEA: 0
SHORTNESS OF BREATH: 0
ABDOMINAL PAIN: 0
CONSTIPATION: 0
RHINORRHEA: 0
VOMITING: 0
BLOOD IN STOOL: 0
SORE THROAT: 0

## 2019-04-30 ASSESSMENT — PAIN DESCRIPTION - LOCATION: LOCATION: HEAD

## 2019-04-30 ASSESSMENT — PAIN DESCRIPTION - FREQUENCY: FREQUENCY: CONTINUOUS

## 2019-04-30 ASSESSMENT — PAIN - FUNCTIONAL ASSESSMENT: PAIN_FUNCTIONAL_ASSESSMENT: PREVENTS OR INTERFERES WITH ALL ACTIVE AND SOME PASSIVE ACTIVITIES

## 2019-04-30 ASSESSMENT — PAIN DESCRIPTION - ONSET: ONSET: ON-GOING

## 2019-04-30 ASSESSMENT — PAIN DESCRIPTION - DESCRIPTORS: DESCRIPTORS: STABBING

## 2019-04-30 ASSESSMENT — PAIN DESCRIPTION - PROGRESSION
CLINICAL_PROGRESSION: NOT CHANGED
CLINICAL_PROGRESSION: NOT CHANGED

## 2019-04-30 ASSESSMENT — PAIN DESCRIPTION - PAIN TYPE: TYPE: ACUTE PAIN

## 2019-04-30 ASSESSMENT — PAIN SCALES - GENERAL: PAINLEVEL_OUTOF10: 9

## 2019-04-30 NOTE — ED NOTES
Pt medicated as ordered and taken to CT for imagining. Pt returned to room, remains lying supine in bed with bed in low position, side rails up x 2, seizure pads in place, heart monitor in place.  remains bedside with pt.       Val Alarcon RN  04/30/19 0176

## 2019-04-30 NOTE — ED NOTES
Pt. Provided with two cups of apple juice and RN explained to pt. That her blood sugar was low. RN to recheck sugar in 10 minutes.        Janelle Mcfarland RN  04/30/19 9131

## 2019-04-30 NOTE — ED NOTES
Dr. Bernardo Corners bedside to evaluate pt and update on plan of care.   bedside to obtain additional bloodwork      Alpesh Vallejo RN  04/30/19 4558

## 2019-04-30 NOTE — ED NOTES
A safety risk assessment of the patient environment was conducted and the room was modified for safety. The room is free of all removed equipment, medical supplies, and articles that may pose a threat to the patient or others such as sharp items and needle boxes. Items were removed from unlocked drawers, cabinets are locked when locks are available, extra linens, medical cords, cables, pictures from wall, ect. Are all removed. The patient call light was left in place due to the medical nature of the unit ad the needs of the patient. The patient was place in a room close to the nursing desk. The patient was placed in a hospital gown. A search of the patient and patient environment was performed. Two staff members (including ) conducted a witnessed search of all belongings in the presence of the patient. All personal items including sharp objects, belts, ties, and ingestibles were removed and secured. The patient and family were educated regarding items brought in by family members and visitors will be searched to verify that no potentially dangerous items are brought in to the patient. If a visitor refuses search of items- visitor will be instructed that the items cannot enter patient room. Patient  at bedside. Bed locked and in lowest position with both side rails raised. Call light within reach. Will monitor pt. hourly.           Angus Mcdonald RN  04/30/19 5568

## 2019-04-30 NOTE — ED NOTES
Bed: 01  Expected date:   Expected time:   Means of arrival:   Comments:  Dhiraj Wright RN  04/30/19 0131

## 2019-04-30 NOTE — ED PROVIDER NOTES
[facility] Emergency Department      Pt Name: Vivienne Mullen  MRN: [id number]  [place] 1975  Date of evaluation: 4/30/2019  Provider: Leslie Rowe MD  I independently performed a history and physical on Vivienne Mullen. All diagnostic, treatment, and disposition decisions were made by myself in conjunction with the advanced practice provider. HPI: Vivienne Mullen presented with   Chief Complaint   Patient presents with    Drug Overdose     Pt. comes in by [facility] from home. Pt. reportedly took Meth last night around midnight and then woke up and had track marks on her left hand and right foot. Per family pt. is altered. Pt. stating in triage, \"please get the people in the hallway to leave\". Vivienne Mullen has a past medical history of Backpain, Headache(784.0), Meningitis due to bacteria, Panic attack, Seizures ([address] and Thyroid disease. She has a past surgical history that includes craniotomy; Cholecystectomy; Upper gastrointestinal endoscopy; Colonoscopy; brain surgery; and Dilation and curettage of uterus. No current facility-administered medications on file prior to encounter. Current Outpatient Medications on File Prior to Encounter   Medication Sig Dispense Refill    cyclobenzaprine (FLEXERIL) 10 MG tablet TK 1 T PO TID  1    gabapentin (NEURONTIN) 600 MG tablet TK 1 T PO TID  2    butorphanol (STADOL) 10 MG/ML nasal spray SPRAY 1 PUFF IN NOTRIL Q 12 HOURS PRN  0    HYDROcodone-acetaminophen (NORCO) 7.5-325 MG per tablet Take 1 tablet by mouth 3 times daily.  alprazolam (XANAX) 0.25 MG tablet Take 0.25 mg by mouth 3 times daily as needed.        PHYSICAL EXAM  Vitals: /71   Pulse 85   Temp 98.2 °F (36.8 °C) (Oral)   Resp 23   Ht 5' (1.524 m)   Wt 100 lb (45.4 kg)   SpO2 94%   BMI 19.53 kg/m²   Constitutional:  41 y.o. female disheveled in appearance, reacting to internal similar  HENT:  Atraumatic scalp, mucous membranes dry  Eyes: Conjunctiva clear, no icterus  Neck:  Supple, no visible JVD, no signs of injury  Cardiovascular:  Regular, no rubs, no discernible murmur  Thorax & Lungs:  No accessory muscle usage, clear  Abdomen:  Soft, non distended, NT  Back:  No deformity  Genitalia:  Deferred  Rectal:  Deferred  Extremities:  No cyanosis, no edema, adequate perfusion, track marks  Skin:  Warm, dry, as above  Neurologic:  Alert, no slurred speech but inappropriate answers to questions, seems to see things in the room that are not there  Psychiatric:  Hallucinating, inappropriate responses, spontaneously moves all extremities but does not really follow commands    Medical Decision Making and Plan:  Briefly, this is an 41 y. o.female who presented with paranoia after using drugs. She reported suicidal thoughts to Shaun Najera. She would not answer my questions and was clearly altered though alert and hallucinating. She did require sedation in order to cooperate for CT imaging. She has slept afterwards but has stable vital signs and is adequately protecting her airway. She is found to have metabolic acidosis. Mild hypoglycemia, improved with juice. Plan is to admit for further care. For further details of [facility] encounter, please see documentation by advanced practice provider RADHA Abdi NP.      Labs Reviewed   CBC WITH AUTO DIFFERENTIAL - Abnormal; Notable for the following components:       Result Value    RBC 3.50 (*)     Hemoglobin 11.5 (*)     Hematocrit 34.7 (*)     RDW 21.8 (*)     All other components within normal limits    Narrative:     Performed at:  [facility] [address], [place], [address]   Phone [phone]   COMPREHENSIVE METABOLIC PANEL W/ REFLEX TO MG FOR LOW K - Abnormal; Notable for the following components:    Potassium reflex Magnesium 3.4 (*)     CO2 16 (*)     Anion Gap 18 (*)     Glucose 64 (*)     Calcium 8.2 (*)     Alb 3.3 (*) Albumin/Globulin Ratio 0.9 (*)     Total Bilirubin 1.8 (*)     All other components within normal limits    Narrative:     Performed at:  [facility] [address] [place], [place]   Phone [phone]   URINE RT REFLEX TO CULTURE - Abnormal; Notable for the following components:    Clarity, UA CLOUDY (*)     Bilirubin Urine SMALL (*)     Ketones, Urine 15 (*)     Protein,  (*)     All other components within normal limits    Narrative:     Performed at:  [facility] [address], [place]   Phone [phone]   [address] - Abnormal; Notable for the following components:    Amphetamine Screen, Urine POSITIVE (*)     Cocaine Metabolite Screen, Urine POSITIVE (*)     Oxycodone Urine POSITIVE (*)     All other components within normal limits    Narrative:     Performed at:  [facility] [address]   Phone [phone]   ACETAMINOPHEN LEVEL - Abnormal; Notable for the following components:    Acetaminophen Level <5 (*)     All other components within normal limits    Narrative:     Performed at:  [facility] [address]   Phone [phone]   SALICYLATE LEVEL - Abnormal; Notable for the following components:    Salicylate, Serum 8.8 (*)     All other components within normal limits    Narrative:     Performed at:  [facility] [address] [employer] [place] [employer]   Phone [phone]   MICROSCOPIC URINALYSIS - Abnormal; Notable for the following components:    Bacteria, UA RARE (*)     Hyaline Casts, UA 10 (*)     WBC, UA 13 (*)     Epi Cells 7 (*)     All other components within normal limits    Narrative:     Performed at:  [facility] [address], [place]   Phone [phone]   [address] REFLEX TO MG FOR LOW K - Abnormal; Notable for the following components:    Chloride 111 (*)     CO2 14 (*)     Glucose 63 (*)     Calcium 7.6 (*)     All other components within normal limits    Narrative:     CALL  Sergey DÍAZ tel. [phone],  Chemistry results called to and read back by Marcella Simms RN, 04/30/2019  14:15, by Sj Hammer  Performed at:  [facility] [address], [place]   Phone [phone]   BLOOD GAS, VENOUS - Abnormal; Notable for the following components:    pH, Trace 7.507 (*)     pCO2, Trace 19.9 (*)     pO2, Trace 142.0 (*)     HCO3, Venous 15.8 (*)     Base Excess, Trace -5.6 (*)     Carboxyhemoglobin 4.7 (*)     All other components within normal limits    Narrative:     Performed at:  [facility] [address], [place]   Phone [phone]   POCT GLUCOSE - Abnormal; Notable for the following components:    POC Glucose 118 (*)     All other components within normal limits    Narrative:     Performed at:  [facility] [address], [place]   Phone [phone]   POCT GLUCOSE - Abnormal; Notable for the following components:    POC Glucose 68 (*)     All other components within normal limits    Narrative:     Performed at:  [facility] [address] [place]   Phone [phone]   POCT GLUCOSE - Normal   URINE CULTURE   CK    Narrative:     Performed at:  [facility] [address], [place]   Phone [phone]   PREGNANCY, URINE    Narrative:     Performed at:  [facility] [address], [place]   Phone [phone]   HCG, SERUM, QUALITATIVE    Narrative:     Performed at:  [facility] [address], [place]   Phone [phone]   ETHANOL    Narrative: Performed at:  [facility]  [address] [facility]  [place], [address] [place] [address]   Phone [phone]   MAGNESIUM    Narrative:     Performed at:  [facility]  [address] [facility]  [place], [address]   Phone [phone]   POCT GLUCOSE    Narrative:     Performed at:  [facility] [address] [facility], [address]   Phone [phone]   POCT GLUCOSE   POCT GLUCOSE     RADIOLOGY:   Plain x-rays were viewed by me: Ct Head Wo Contrast    Result Date: 4/30/2019  EXAMINATION: CT OF THE HEAD WITHOUT CONTRAST  4/30/2019 2:39 pm TECHNIQUE: CT of the head was performed without the administration of intravenous contrast. Dose modulation, iterative reconstruction, and/or weight based adjustment of the mA/kV was utilized to reduce the radiation dose to as low as reasonably achievable. COMPARISON: CT head 08/19/2018, 12/09/2018. HISTORY: ORDERING SYSTEM PROVIDED HISTORY: mental status change TECHNOLOGIST PROVIDED HISTORY: Has a \"code stroke\" or \"stroke alert\" been called? ->No Ordering Physician Provided Reason for Exam: Drug Overdose (Pt. comes in by [place] [facility] from home. Pt. reportedly took Meth last night around midnight and then woke up and had track marks on her left hand and right foot. Per family pt. is altered. Pt. stating in triage, \"please get the people in the hallway to leave Acuity: Acute Type of Exam: Initial FINDINGS: BRAIN/VENTRICLES: There is no intracranial hemorrhage. The ventricles and basal cisterns are patent. There is no midline shift or extra-axial collection. There is no acute transcortical infarct. ORBITS: The visualized portion of the orbits demonstrate no acute abnormality. SINUSES: The visualized paranasal sinuses and mastoid air cells demonstrate no acute abnormality. SOFT TISSUES/SKULL:  There is no depressed calvarial fracture. Redemonstration of a right craniectomy and cranioplasty.      No acute intracranial abnormality or significant interval change from prior study 12/09/2018. Xr Chest Portable    Result Date: 4/30/2019  EXAMINATION: SINGLE XRAY VIEW OF THE CHEST 4/30/2019 2:04 pm COMPARISON: 08/08/2018 HISTORY: ORDERING SYSTEM PROVIDED HISTORY: [facility] TECHNOLOGIST PROVIDED HISTORY: Reason for exam:->ams Ordering Physician Provided Reason for Exam: Altered mental status. Tech helped hold patient for xrays. Acuity: Acute Type of Exam: Initial FINDINGS: The heart size is normal.  There is no pneumothorax, edema or focal consolidation. An age-indeterminate right posterior 3rd rib fracture is noted. This appears old although is new when compared to the 12/09/2018 CT exam.     No evidence of acute cardiopulmonary disease. Right posterior 3rd rib fracture, likely subacute. EKG:  Read by me in the absence of a cardiologist shows:  Sinus rhythm, 107 rate, normal conduction intervals, normal axis, no acute injury pattern, no prior EKG available for comparison     CRITICAL CARE:   Total critical care time of 31 minutes (excludes any time for procedures). This includes but not limited to vital sign monitoring, medication, clinical response to medications, interpretation of diagnostics, review of nursing notes, pertinent record review, discussions about patient condition, consultation time, documentation time. Critical care due to the patient's AMS, substance abuse.     Vitals:    04/30/19 1121 04/30/19 1135 04/30/19 1230 04/30/19 1330   BP: (!) 114/90 (!) 100/49 (!) 117/30 100/71   Pulse: 96 97 84 85   Resp: 20 22 21 23   Temp:       TempSrc:       SpO2: 100% 100% 98% 94%   Weight:       Height:         Medications administered:  Medications   0.9 % sodium chloride bolus (0 mLs Intravenous Stopped 4/30/19 1244)   LORazepam (ATIVAN) injection 1 mg (1 mg Intravenous Given 4/30/19 1000)   lactated ringers infusion ( Intravenous Stopped 4/30/19 1740)   midazolam (VERSED) injection 4 mg (4 mg Intravenous Given 4/30/19 1414)     FINAL IMPRESSION:    1. Polysubstance abuse ([facility]    2. Suicidal ideation    3. Transient alteration of awareness    4. Hallucination, drug-induced ([facility]    5. Hypoglycemia    6.  Metabolic acidosis         Rhona Villar MD  04/30/19 1135

## 2019-04-30 NOTE — ED NOTES
Pt returned from CT; remains lying supine in bed with bed in low position, side rails up x 2, seizure pads in place,  remains bedside with pt.       Manda Pike RN  04/30/19 8023

## 2019-04-30 NOTE — H&P
Hospital Medicine History & Physical      PCP: Krysta Rodney MD    Date of Admission: 4/30/2019    Date of Service: Pt seen/examined on 4/30/2019 and Admitted to Inpatient with expected LOS greater than two midnights due to medical therapy. Chief Complaint: Altered mental status      History Of Present Illness:      41 y.o. female who has history of chronic pain, seen by pain management at least in the past 3 years presented to [facility] with drug overdose. The patient took methamphetamine around midnight yesterday through IV injection. She reported suicidal thoughts in the emergency room. When I come to bedside to evaluate the patient patient significantly lethargic and not able to articulate any words apparently nonverbal, not following commands however able to open eyes to  voice. The patient received Versed and Ativan before to be able to undergo CT scan. Currently there is a sitter in the room for suicidal precaution. Her urine drug screen in the emergency room showed amphetamine positive, cocaine positive and oxycodone positive. Her basic metabolic panel shows hypoglycemia, chest x-ray shows subacute third right posterior rib fracture, CT scan of head did not show any acute intracranial abnormality. Hospitalist service called for further management of this patient. Past Medical History:          Diagnosis Date    Backpain     Headache(784.0)     Meningitis due to bacteria     Panic attack     Seizures ([facility]     Thyroid disease        Past Surgical History:          Procedure Laterality Date    BRAIN SURGERY      CHOLECYSTECTOMY      COLONOSCOPY      CRANIOTOMY      DILATION AND CURETTAGE OF UTERUS      UPPER GASTROINTESTINAL ENDOSCOPY         Medications Prior to Admission:      Prior to Admission medications    Medication Sig Start Date End Date Taking?  Authorizing Provider   cyclobenzaprine (FLEXERIL) 10 MG tablet TK 1 T PO TID 4/29/19  Yes Historical Provider, MD   gabapentin (NEURONTIN) 600 MG tablet TK 1 T PO TID 4/20/19  Yes Historical Provider, MD   butorphanol (STADOL) 10 MG/ML nasal spray SPRAY 1 PUFF IN NOTRIL Q 12 HOURS PRN 4/10/19  Yes Historical Provider, MD   HYDROcodone-acetaminophen (NORCO) 7.5-325 MG per tablet Take 1 tablet by mouth 3 times daily. Yes Historical Provider, MD   alprazolam (XANAX) 0.25 MG tablet Take 0.25 mg by mouth 3 times daily as needed. 6/22/10  Yes Historical Provider, MD       Allergies:  Fioricet [butalbital-apap-caffeine]; Phenytoin sodium extended; Reglan [metoclopramide]; Toradol [ketorolac tromethamine]; Erythromycin; and Penicillins    Social History:      The patient currently lives at home    TOBACCO:   reports that she has quit smoking. Her smoking use included cigarettes. She has a 25.00 pack-year smoking history. She has never used smokeless tobacco.  ETOH:   reports that she does not drink alcohol. Family History:       Reviewed in detail and negative for DM, CAD, Cancer, CVA. Positive as follows:    History reviewed. No pertinent family history. REVIEW OF SYSTEMS:   Pertinent positives as noted in the HPI. All other systems reviewed and negative. PHYSICAL EXAM PERFORMED:    /63   Pulse 76   Temp 98.2 °F (36.8 °C) (Oral)   Resp 21   Ht 5' (1.524 m)   Wt 100 lb (45.4 kg)   SpO2 98%   BMI 19.53 kg/m²     General appearance:   lady apparently very lethargic, nonverbal, however not following commands but open eyes to voice. HEENT:  Normal cephalic, atraumatic without obvious deformity. Pupils equal, round, and reactive to light. Extra ocular muscles intact. Conjunctivae/corneas clear. Oral mucous membranes moist  Neck: Supple, with full range of motion. No jugular venous distention. Trachea midline. Respiratory:  Normal respiratory effort. Clear to auscultation, bilaterally without Rales/Wheezes/Rhonchi.   Cardiovascular:  Regular rate and rhythm with normal S1/S2 without murmurs, rubs or gallops. Abdomen: Soft, non-tender, non-distended with normal bowel sounds. No hepatopslenomegaly  Musculoskeletal:  Full range of motion without deformity. Skin: Skin color, texture, turgor normal.  No rashes or lesions. Multiple tattoos in lower extremities  Neurologic:  Neurovascularly intact without any focal sensory/motor deficits. Cranial nerves: II-XII intact, grossly non-focal.  Psychiatric:  Alert, arousable  Capillary Refill: Brisk,< 3 seconds   Extremities: Peripheral Pulses +2 palpable, equal bilaterally,  No clubbing, cyanosis or edema bilaterally    Labs:     Recent Labs     04/30/19  1015   WBC 8.7   HGB 11.5*   HCT 34.7*        Recent Labs     04/30/19  1014 04/30/19  1345    137   K 3.4* 3.6    111*   CO2 16* 14*   BUN 14 12   CREATININE 1.0 0.7   CALCIUM 8.2* 7.6*     Recent Labs     04/30/19  1014   AST 23   ALT 21   BILITOT 1.8*   ALKPHOS 109     No results for input(s): INR in the last 72 hours. Recent Labs     04/30/19  1014   CKTOTAL 96       Urinalysis:      Lab Results   Component Value Date    NITRU Negative 04/30/2019    WBCUA 13 04/30/2019    BACTERIA RARE 04/30/2019    RBCUA 2 04/30/2019    BLOODU Negative 04/30/2019    SPECGRAV 1.015 04/30/2019    GLUCOSEU Negative 04/30/2019    GLUCOSEU NEGATIVE 11/28/2010       Radiology:         CT Head WO Contrast   Final Result   No acute intracranial abnormality or significant interval change from prior   study 12/09/2018. XR CHEST PORTABLE   Preliminary Result   No evidence of acute cardiopulmonary disease. Right posterior 3rd rib fracture, likely subacute. ASSESSMENT:    Active Hospital Problems    Diagnosis Date Noted    Methamphetamine intoxication [facility]) [F15.929] 04/30/2019   Acute encephalopathy due to multiple substance abuse    Hypoglycemia   Cocaine use disorder  Suicidal ideation   Chronic pain  Narcotic dependence      PLAN:    1.   Admit to medical floor with telemetry, continue IV fluids, will continue with IV Ativan as needed 1 mg every 6 hours to prevent psychomotor agitation secondary to methamphetamine. Patient also uses cocaine however this is more short acting than methamphetamine as a stimulant. 2.  We will start D5 half-normal saline +20 KCl 100 ML per hour until tomorrow morning, keep checking fingerstick blood sugar as needed   3. We will ask psychiatry to see this patient due to suicidal ideation  4. We'll continue home medications for chronic pain we will watch closely         Sharon Parson MD    Thank you Jasen Benjamin MD for the opportunity to be involved in this patient's care. If you have any questions or concerns please feel free to contact me at [phone].

## 2019-04-30 NOTE — ED NOTES
Pharmacy Medication History Note      List of current medications patient is taking is complete. Source of information: [place]    Changes made to medication list:  Medications flagged for removal (include reason, ex. noncompliance):  N/A    Medications removed (include reason, ex. therapy complete or physician discontinued):  Lamotrigine- discontinued  Topamax- discontinued  Naproxen- therapy complete  Citalopram- discontinued     Medications added/doses adjusted:  Cyclobenzaprine 10mg- TID  Butorphanol nasal- BID prn    Other notes (ex. Recent course of antibiotics, Coumadin dosing):  Denies use of other OTC or herbal medications. Last dose times updated. Edwige Swain [facility]    No current facility-administered medications on file prior to encounter. Current Outpatient Medications on File Prior to Encounter   Medication Sig Dispense Refill    cyclobenzaprine (FLEXERIL) 10 MG tablet TK 1 T PO TID  1    gabapentin (NEURONTIN) 600 MG tablet TK 1 T PO TID  2    butorphanol (STADOL) 10 MG/ML nasal spray SPRAY 1 PUFF IN NOTRIL Q 12 HOURS PRN  0    HYDROcodone-acetaminophen (NORCO) 7.5-325 MG per tablet Take 1 tablet by mouth 3 times daily. alprazolam (XANAX) 0.25 MG tablet Take 0.25 mg by mouth 3 times daily as needed. [DISCONTINUED] Butorphanol Tartrate (STADOL NS NA) by Nasal route      [DISCONTINUED] naproxen (NAPROSYN) 500 MG tablet Take 1 tablet by mouth 2 times daily as needed for Pain 20 tablet 0    [DISCONTINUED] citalopram (CELEXA) 10 MG tablet Take 10 mg by mouth daily      [DISCONTINUED] topiramate (TOPAMAX) 50 MG tablet Take 100 mg by mouth 2 times daily       [DISCONTINUED] gabapentin (NEURONTIN) 800 MG tablet Take 600 mg by mouth 3 times daily.       [DISCONTINUED] lamotrigine (LAMICTAL) 200 MG tablet Take 200 mg by mouth See Admin Instructions 200 mg in am , 250 mg at night

## 2019-04-30 NOTE — ED NOTES
Assumed care of pt from [facility]. Pt on 72 hour hold. Security has belongings with ties cut off of gown. Wall mount and loose objects removed from room with cart locked.  at bedside. Pt's family at bedside. Room ready for admission. Will call report.       Norbert Byrnes RN  04/30/19 6405

## 2019-04-30 NOTE — ED NOTES
Suicide precautions remain in place, head board removed for pt. Safety, bedside drawer locked, and extra equipment removed for pt's. Safety.  at bedside within an arm's reach. Pt. Remains on cardiac monitor, cycling BP cuff and continuous pulse ox. Pt. Denies any needs, will continue to monitor.        Ashlie Redd RN  04/30/19 9223

## 2019-04-30 NOTE — ED NOTES
Pt. Straight stuck X1 in her left wrist and was successful. Blood work drawn, labeled at bedside, and sent down to the lab. After RN completed blood draw pt. States \"you just shot me up with Heorin didn't you\". RN stated no I did no I just inga you blood.  Nata Banks remains at pt. Bedside, siderails paded X2 due to pt's. Seizure history, pt. On cardiac monitor with cycling BP cuff and continuous pulse ox, will continue to monitor.      Margaret Ricardo RN  04/30/19 8337

## 2019-04-30 NOTE — ED NOTES
As RN was administering Ativan pt. States Cozetta Citrin me more I want to die\". RN told pt. I was not allowed to give her anymore than what was ordered by the physician. Nikolai Khan ED tech at bedside throughtout PIV placement and medication administration.       Sonido Mcmahon RN  04/30/19 5683

## 2019-04-30 NOTE — ED PROVIDER NOTES
[address] [facility]        Pt Name: Lencho Dickinson  MRN: [id number]  [place] 1975  Date of evaluation: 4/30/2019  Provider: JESSE Booker CNP  PCP: Edward Brunner, [facility]       Chief Complaint   Patient presents with    Drug Overdose     Pt. comes in by [facility] from home. Pt. reportedly took Meth last night around midnight and then woke up and had track marks on her left hand and right foot. Per family pt. is altered. Pt. stating in triage, \"please get the people in the hallway to leave\". HISTORY OF PRESENT ILLNESS   (Location/Symptom, Timing/Onset,Context/Setting, Quality, Duration, Modifying Factors, Severity)  Note limiting factors. Lencho Dickinson is a 41 y.o. female who presents to ER with concern for drug use. The patient reports that she injected methamphetamine last night. She reports she does use this regularly but is having an reaction to it that she is not familiar with. She is very paranoid and asks me to stop recording her which is not occurring. She also asked me to tell a lady in the room to leave. Metolazone else in the room except os. She reports that she has had thoughts of suicidal ideation since February. She does report that she's had suicidal attempts but refuses to come in with a war. She does report that she is a plan to kill herself now but will not tell me her plan. She states, \"I don't want to be here. \"  She is very tearful on exam.  She does report daily opioid use for chronic low back pain as well as occasional marijuana use. She denies fever, rash, headaches, dizziness, chest pain, shortness of breath, cough, congestion, abdominal pain, nausea, vomiting, diarrhea, constipation, blood in the stool, or painful urination. No family at bedside.        Nursing Notes triage note reviewed and agreed with or any disagreements were addressed  in the HPI.    REVIEW OF SYSTEMS    (2-9 systems for level 4, 10 or more for level 5)     Review of Systems   Constitutional: Negative for chills and fever. HENT: Negative for postnasal drip, rhinorrhea and sore throat. Eyes: Negative for visual disturbance. Respiratory: Negative for shortness of breath. Cardiovascular: Negative for chest pain. Gastrointestinal: Negative for abdominal pain, blood in stool, constipation, diarrhea, nausea and vomiting. Genitourinary: Negative for dysuria, flank pain and hematuria. Skin: Negative for rash. Neurological: Negative for weakness and headaches. Psychiatric/Behavioral: Positive for agitation, confusion, hallucinations and suicidal ideas. The patient is nervous/anxious. All other systems reviewed and are negative. Positives and Pertinent negatives as per HPI. Except as noted above in the ROS, all other systems were reviewed and negative. PAST MEDICAL HISTORY     Past Medical History:   Diagnosis Date    Backpain     Headache(784.0)     Meningitis due to bacteria     Panic attack     Seizures ([facility] [address]     Thyroid disease          SURGICAL HISTORY       Past Surgical History:   Procedure Laterality Date    BRAIN SURGERY      CHOLECYSTECTOMY      COLONOSCOPY      CRANIOTOMY      DILATION AND CURETTAGE OF UTERUS      UPPER GASTROINTESTINAL ENDOSCOPY           CURRENT MEDICATIONS       Previous Medications    ALPRAZOLAM (XANAX) 0.25 MG TABLET    Take 0.25 mg by mouth 3 times daily as needed. BUTORPHANOL (STADOL) 10 MG/ML NASAL SPRAY    SPRAY 1 PUFF IN NOTRIL Q 12 HOURS PRN    CYCLOBENZAPRINE (FLEXERIL) 10 MG TABLET    TK 1 T PO TID    GABAPENTIN (NEURONTIN) 600 MG TABLET    TK 1 T PO TID    HYDROCODONE-ACETAMINOPHEN (NORCO) 7.5-325 MG PER TABLET    Take 1 tablet by mouth 3 times daily. ALLERGIES     Fioricet [butalbital-apap-caffeine]; Phenytoin sodium extended; Reglan [metoclopramide];  Toradol [ketorolac tromethamine]; Erythromycin; and Penicillins    FAMILY HISTORY     History reviewed. No pertinent family history. SOCIAL HISTORY       Social History     Socioeconomic History    Marital status: Single     Spouse name: None    Number of children: None    Years of education: None    Highest education level: None   Occupational History    None   Social Needs    Financial resource strain: None    Food insecurity:     Worry: None     Inability: None    Transportation needs:     Medical: None     Non-medical: None   Tobacco Use    Smoking status: Former Smoker     Packs/day: 1.00     Years: 25.00     Pack years: 25.00     Types: Cigarettes    Smokeless tobacco: Never Used   Substance and Sexual Activity    Alcohol use: No     Alcohol/week: 0.6 oz     Types: 1 Shots of liquor per week    Drug use: Yes     Frequency: 10.0 times per week     Types: Marijuana, Methamphetamines     Comment: SISTER ANSWERED FOR PATIENT    Sexual activity: Never   Lifestyle    Physical activity:     Days per week: None     Minutes per session: None    Stress: None   Relationships    Social connections:     Talks on phone: None     Gets together: None     Attends Voodoo service: None     Active member of club or organization: None     Attends meetings of clubs or organizations: None     Relationship status: None    Intimate partner violence:     Fear of current or ex partner: None     Emotionally abused: None     Physically abused: None     Forced sexual activity: None   Other Topics Concern    None   Social History Narrative    None       SCREENINGS             PHYSICAL EXAM  (up to 7 for level 4, 8 or more for level 5)     ED Triage Vitals [04/30/19 0857]   BP Temp Temp Source Pulse Resp SpO2 Height Weight   106/88 98.2 °F (36.8 °C) Oral 96 20 97 % 5' (1.524 m) 100 lb (45.4 kg)       Physical Exam   Constitutional: She is oriented to person, place, and time. She appears well-developed and well-nourished.    HENT:   Head: [facility]  [address]  [place], [place]   Phone [phone]   URINE RT REFLEX TO CULTURE - Abnormal; Notable for the following components:    Clarity, UA CLOUDY (*)     Bilirubin Urine SMALL (*)     Ketones, Urine 15 (*)     Protein,  (*)     All other components within normal limits    Narrative:     Performed at:  [facility]  [address], [place]   Phone [phone]   [address] - Abnormal; Notable for the following components:    Amphetamine Screen, Urine POSITIVE (*)     Cocaine Metabolite Screen, Urine POSITIVE (*)     Oxycodone Urine POSITIVE (*)     All other components within normal limits    Narrative:     Performed at:  [facility]  [address] [employer] [employer], [place]   Phone [phone]   ACETAMINOPHEN LEVEL - Abnormal; Notable for the following components:    Acetaminophen Level <5 (*)     All other components within normal limits    Narrative:     Performed at:  [facility]  [address] [employer], [place]   Phone [phone]   SALICYLATE LEVEL - Abnormal; Notable for the following components:    Salicylate, Serum 8.8 (*)     All other components within normal limits    Narrative:     Performed at:  [facility]  [address] [employer], [place]   Phone [phone]   MICROSCOPIC URINALYSIS - Abnormal; Notable for the following components:    Bacteria, UA RARE (*)     Hyaline Casts, UA 10 (*)     WBC, UA 13 (*)     Epi Cells 7 (*)     All other components within normal limits    Narrative:     Performed at:  [facility]  [address] [employer], [place]   Phone [phone]   BASIC METABOLIC PANEL W/ REFLEX TO MG FOR LOW K - Abnormal; Notable for the following components:    Chloride 111 (*)     CO2 14 (*)     Glucose 63 (*)     Calcium 7.6 (*) [phone]   POCT GLUCOSE   POCT GLUCOSE       All other labs werewithin normal range or not returned as of this dictation. EKG: All EKG's are interpreted by the Emergency Department Physician who either signs or Co-signs this chart in the absence of acardiologist.  Please see their note for interpretation of EKG. RADIOLOGY:   Interpretation per the Radiologist below, if available at the time of this note:    CT Head WO Contrast   Final Result   No acute intracranial abnormality or significant interval change from prior   study 12/09/2018. XR CHEST PORTABLE   Preliminary Result   No evidence of acute cardiopulmonary disease. Right posterior 3rd rib fracture, likely subacute. No results found. PROCEDURES   Unless otherwise noted below, none     Procedures    CRITICAL CARE TIME     n/a    CONSULTS:  IP CONSULT TO HOSPITALIST      EMERGENCY DEPARTMENT COURSE and DIFFERENTIAL DIAGNOSIS/MDM:   Vitals:    Vitals:    04/30/19 1437 04/30/19 1500 04/30/19 1521 04/30/19 1530   BP:  97/60  100/63   Pulse: 79 78 72 76   Resp: 23 21 20 21   Temp:       TempSrc:       SpO2: 100% 96% 100% 98%   Weight:       Height:           Luz Marina Fischer was given the following medications:  Medications   0.9 % sodium chloride bolus (has no administration in time range)   0.9 % sodium chloride bolus (0 mLs Intravenous Stopped 4/30/19 1244)   LORazepam (ATIVAN) injection 1 mg (1 mg Intravenous Given 4/30/19 1000)   lactated ringers infusion ( Intravenous New Bag 4/30/19 1414)   midazolam (VERSED) injection 4 mg (4 mg Intravenous Given 4/30/19 1414)       Luz Marina Fischer was evaluated in the emergency department with concern for transient altered mental status. This is secondary to polysubstance abuse treated she is also reporting suicidal ideation on my exam.  She is very agitated. Treated with Ativan with improvement in her agitation. Laboratory evaluation performed. She is hypoglycemic with a low CO2. She is given fluids and OJ. Initially her blood sugar did improve, however it did drop again.  was consulted and is in agreement for admission. This plan was discussed with the patient, and my attending, Dale Juarez, *, and both are in agreement with the plan. Please see attending note. FINAL IMPRESSION      1. Polysubstance abuse ([address]    2. Suicidal ideation    3. Transient alteration of awareness    4. Hallucination, drug-induced ([address]    5.  Hypoglycemia          DISPOSITION/PLAN   DISPOSITION Decision To Admit 04/30/2019 03:15:09 PM      (Please note that portions of this note were completed with a voice recognition program.  Efforts were made to edit the dictations but occasionally words are mis-transcribed.)    JESSE Troy CNP (electronically signed)        JESSE Troy CNP  04/30/19 8271

## 2019-05-01 PROBLEM — E43 SEVERE MALNUTRITION (HCC): Status: ACTIVE | Noted: 2019-05-01

## 2019-05-01 LAB
ANION GAP SERPL CALCULATED.3IONS-SCNC: 9 MMOL/L (ref 3–16)
BUN BLDV-MCNC: 9 MG/DL (ref 7–20)
CALCIUM SERPL-MCNC: 7.8 MG/DL (ref 8.3–10.6)
CHLORIDE BLD-SCNC: 110 MMOL/L (ref 99–110)
CO2: 21 MMOL/L (ref 21–32)
CREAT SERPL-MCNC: 0.6 MG/DL (ref 0.6–1.1)
EKG ATRIAL RATE: 107 BPM
EKG DIAGNOSIS: NORMAL
EKG P AXIS: 64 DEGREES
EKG P-R INTERVAL: 120 MS
EKG Q-T INTERVAL: 360 MS
EKG QRS DURATION: 66 MS
EKG QTC CALCULATION (BAZETT): 480 MS
EKG R AXIS: 82 DEGREES
EKG T AXIS: 53 DEGREES
EKG VENTRICULAR RATE: 107 BPM
GFR AFRICAN AMERICAN: >60
GFR NON-AFRICAN AMERICAN: >60
GLUCOSE BLD-MCNC: 96 MG/DL (ref 70–99)
HCT VFR BLD CALC: 33.6 % (ref 36–48)
HEMOGLOBIN: 11.2 G/DL (ref 12–16)
MAGNESIUM: 2 MG/DL (ref 1.8–2.4)
MCH RBC QN AUTO: 33.3 PG (ref 26–34)
MCHC RBC AUTO-ENTMCNC: 33.4 G/DL (ref 31–36)
MCV RBC AUTO: 99.9 FL (ref 80–100)
PDW BLD-RTO: 21.3 % (ref 12.4–15.4)
PHOSPHORUS: 3.7 MG/DL (ref 2.5–4.9)
PLATELET # BLD: 293 K/UL (ref 135–450)
PMV BLD AUTO: 7.5 FL (ref 5–10.5)
POTASSIUM SERPL-SCNC: 3.1 MMOL/L (ref 3.5–5.1)
RBC # BLD: 3.37 M/UL (ref 4–5.2)
SODIUM BLD-SCNC: 140 MMOL/L (ref 136–145)
URINE CULTURE, ROUTINE: NORMAL
WBC # BLD: 6.3 K/UL (ref 4–11)

## 2019-05-01 PROCEDURE — 2580000003 HC RX 258: Performed by: HOSPITALIST

## 2019-05-01 PROCEDURE — 36415 COLL VENOUS BLD VENIPUNCTURE: CPT

## 2019-05-01 PROCEDURE — G0378 HOSPITAL OBSERVATION PER HR: HCPCS

## 2019-05-01 PROCEDURE — 99222 1ST HOSP IP/OBS MODERATE 55: CPT | Performed by: PSYCHIATRY & NEUROLOGY

## 2019-05-01 PROCEDURE — 93010 ELECTROCARDIOGRAM REPORT: CPT | Performed by: INTERNAL MEDICINE

## 2019-05-01 PROCEDURE — 84100 ASSAY OF PHOSPHORUS: CPT

## 2019-05-01 PROCEDURE — 83735 ASSAY OF MAGNESIUM: CPT

## 2019-05-01 PROCEDURE — 6370000000 HC RX 637 (ALT 250 FOR IP): Performed by: HOSPITALIST

## 2019-05-01 PROCEDURE — 96372 THER/PROPH/DIAG INJ SC/IM: CPT

## 2019-05-01 PROCEDURE — 80048 BASIC METABOLIC PNL TOTAL CA: CPT

## 2019-05-01 PROCEDURE — 6370000000 HC RX 637 (ALT 250 FOR IP): Performed by: INTERNAL MEDICINE

## 2019-05-01 PROCEDURE — 2060000000 HC ICU INTERMEDIATE R&B

## 2019-05-01 PROCEDURE — 94760 N-INVAS EAR/PLS OXIMETRY 1: CPT

## 2019-05-01 PROCEDURE — 85027 COMPLETE CBC AUTOMATED: CPT

## 2019-05-01 PROCEDURE — 6360000002 HC RX W HCPCS: Performed by: HOSPITALIST

## 2019-05-01 PROCEDURE — 2500000003 HC RX 250 WO HCPCS: Performed by: HOSPITALIST

## 2019-05-01 RX ORDER — POTASSIUM CHLORIDE 20 MEQ/1
40 TABLET, EXTENDED RELEASE ORAL ONCE
Status: COMPLETED | OUTPATIENT
Start: 2019-05-01 | End: 2019-05-01

## 2019-05-01 RX ADMIN — GABAPENTIN 600 MG: 300 CAPSULE ORAL at 22:57

## 2019-05-01 RX ADMIN — ALPRAZOLAM 0.25 MG: 0.25 TABLET ORAL at 22:59

## 2019-05-01 RX ADMIN — POTASSIUM CHLORIDE 40 MEQ: 1500 TABLET, EXTENDED RELEASE ORAL at 06:55

## 2019-05-01 RX ADMIN — HYDROCODONE BITARTRATE AND ACETAMINOPHEN 1 TABLET: 7.5; 325 TABLET ORAL at 08:36

## 2019-05-01 RX ADMIN — GABAPENTIN 600 MG: 300 CAPSULE ORAL at 17:49

## 2019-05-01 RX ADMIN — GABAPENTIN 600 MG: 300 CAPSULE ORAL at 08:36

## 2019-05-01 RX ADMIN — ENOXAPARIN SODIUM 40 MG: 40 INJECTION SUBCUTANEOUS at 08:37

## 2019-05-01 RX ADMIN — POTASSIUM CHLORIDE, DEXTROSE MONOHYDRATE AND SODIUM CHLORIDE: 150; 5; 450 INJECTION, SOLUTION INTRAVENOUS at 08:35

## 2019-05-01 RX ADMIN — Medication 10 ML: at 22:58

## 2019-05-01 RX ADMIN — HYDROCODONE BITARTRATE AND ACETAMINOPHEN 1 TABLET: 7.5; 325 TABLET ORAL at 17:49

## 2019-05-01 RX ADMIN — ALPRAZOLAM 0.25 MG: 0.25 TABLET ORAL at 17:54

## 2019-05-01 RX ADMIN — HYDROCODONE BITARTRATE AND ACETAMINOPHEN 1 TABLET: 7.5; 325 TABLET ORAL at 22:57

## 2019-05-01 ASSESSMENT — PAIN DESCRIPTION - PROGRESSION
CLINICAL_PROGRESSION: NOT CHANGED

## 2019-05-01 ASSESSMENT — PAIN SCALES - GENERAL
PAINLEVEL_OUTOF10: 0
PAINLEVEL_OUTOF10: 7
PAINLEVEL_OUTOF10: 8
PAINLEVEL_OUTOF10: 0
PAINLEVEL_OUTOF10: 7
PAINLEVEL_OUTOF10: 0
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
## 2019-05-01 NOTE — PROGRESS NOTES
Messaged Dr. Gwendolyn Altamirano via [employer]: Morning lab potassium low 3.1.  No K replacement order in Mar.

## 2019-05-01 NOTE — PROGRESS NOTES
Patient arrived on the floor with sister and brother-in-law @65 and has been admitted to room 457 [id number]. Alert and oriented to place, situation and people but disoriented to time. Pt is able to ambulate into bathroom with standby assistance but her gait is unsteady. Pt and family are oriented to the floor and room. She follows commands, cooperative and answered most of admission questions. Pt is also tearful when family members are in the room. Per family, pt has suicidal thoughts. Bed is locked in the lowest position with 2/4 side rails up and bed alarm on. Call light and bed top table are within reach. Pt is agitated, anxious, and restless. Pt is on camera monitor and accompanied with PCA staff to ensure her safety. IV fluid starts, Xanax and Ativan are given as scheduled. After eating snacks, pt goes to sleep. VSS.  Morning glucose 96, K+3.1.  40 mEq KCl is given orally @7am.

## 2019-05-01 NOTE — ED NOTES
FSBS=99. Pt to be transferred to room 380. Report called to 3T RN - pt to be placed on portable telemetry. Constant observer with pt. Suicide precautions maintained.       Kar Lara RN  04/30/19 2005

## 2019-05-01 NOTE — PROGRESS NOTES
Shift assessment complete and morning medications given. Patient is resting in bed at this time. Denies additional needs. Call light is in reach and bed alarm is engaged. Will continue to monitor.  Kartik Bello RN

## 2019-05-01 NOTE — PROGRESS NOTES
[facility]    5/1/2019 11:14 AM        Name: Lakshmi Kohler . Admitted: 4/30/2019  Primary Care Provider: Jalen Christianson MD (Tel: [phone])                        Hospital course:    41 y.o. female who has history of chronic pain, seen by pain management at least in the past 3 years presented to [facility] with drug overdose. Subjective:   Patient able to talk, answered questions today, father at the bedside, over the weekend she went to [id number] [facility] stay with a friend's house and she used some illicit drugs at that time she doesn't remember how to use exactly she come back on Sunday, April 28. Her fatherwho  works in the third shift actually observed her .   She was fine on Sunday however on Tuesday (4/30) she got hallucinating started doing the weird things and she got transferred to the emergency room      Current Medications    gabapentin (NEURONTIN) capsule 600 mg TID   ALPRAZolam (XANAX) tablet 0.25 mg TID PRN   HYDROcodone-acetaminophen (NORCO) 7.5-325 MG per tablet 1 tablet TID   sodium chloride flush 0.9 % injection 10 mL 2 times per day   sodium chloride flush 0.9 % injection 10 mL PRN   magnesium hydroxide (MILK OF MAGNESIA) 400 MG/5ML suspension 30 mL Daily PRN   ondansetron (ZOFRAN) injection 4 mg Q6H PRN   enoxaparin (LOVENOX) injection 40 mg Daily   LORazepam (ATIVAN) injection 1 mg Q6H PRN   dextrose 5 % and 0.45 % NaCl with KCl 20 mEq infusion Continuous       Objective:  /71   Pulse 69   Temp 97.7 °F (36.5 °C) (Axillary)   Resp 16   Ht 5' (1.524 m)   Wt 103 lb 14.4 oz (47.1 kg)   SpO2 96%   BMI 20.29 kg/m²     Intake/Output Summary (Last 24 hours) at 5/1/2019 1114  Last data filed at 5/1/2019 0850  Gross per 24 hour   Intake 237 ml   Output 1 ml   Net 236 ml    Wt Readings from Last 3 Encounters:   05/01/19 103 lb 14.4 oz (47.1 kg)   02/25/19 120 lb (54.4 kg)   08/19/18 120 lb (54.4 kg)       General appearance:  Appears comfortable  Eyes: Sclera clear. Pupils equal.  ENT: Moist oral mucosa. Trachea midline, no adenopathy. Cardiovascular: Regular rhythm, normal S1, S2. No murmur. No edema in lower extremities  Respiratory: Not using accessory muscles. Good inspiratory effort. Clear to auscultation bilaterally, no wheeze or crackles. GI: Abdomen soft, no tenderness, not distended, normal bowel sounds  Musculoskeletal: No cyanosis in digits, neck supple  Neurology: CN 2-12 grossly intact. No speech or motor deficits  Psych: Normal affect. Alert and oriented in time, place and person  Skin: Warm, dry, normal turgor    Labs and Tests:  CBC:   Recent Labs     04/30/19  1015 05/01/19  0449   WBC 8.7 6.3   HGB 11.5* 11.2*    293     BMP:  Recent Labs     04/30/19  1014  04/30/19  1345 04/30/19  1925 05/01/19  0449     --  137  --  140   K 3.4*  --  3.6  --  3.1*     --  111*  --  110   CO2 16*  --  14*  --  21   BUN 14  --  12  --  9   CREATININE 1.0  --  0.7  --  0.6   GLUCOSE 64*   < > 63* 58 96    < > = values in this interval not displayed. Hepatic: Recent Labs     04/30/19  1014   AST 23   ALT 21   BILITOT 1.8*   ALKPHOS 109         Problem List  Active Problems:    Methamphetamine intoxication ([facility]  Resolved Problems:    * No resolved hospital problems. *   Acute encephalopathy due to multiple substance abuse    Hypoglycemia   Cocaine use disorder  Suicidal ideation   Chronic pain  Narcotic dependence        Assessment & Plan:     1. Today I was able to get more information from father and patient himself actually what happened once patient  withdraw from methamphetamine caused her acute psychosis and psychomotor agitation and admitted to hospital   today very calm and quite`  2. We'll check fingerstick blood sugar today and then stop IV fluids, resume regular diet  3.   Patient sounds like she is

## 2019-05-01 NOTE — PLAN OF CARE
Nutrition Problem: Severe malnutrition  Intervention: Food and/or Nutrient Delivery: Continue current diet, Start ONS  Nutritional Goals: PO 50% or more at meals/supp

## 2019-05-01 NOTE — PROGRESS NOTES
Nutrition Assessment    Type and Reason for Visit: Initial, Positive Nutrition Screen    Nutrition Recommendations:   1. Continue current diet  2. Encourage po  3. RD ordered Ensure Enlive BID    Nutrition Assessment: +IP for wt loss. MST 3. Pt admitted with meth overdose. Pt is fast asleep. RD unable to wake pt. Pt has barely eating. PO 25-50%. Pt has lost 17 lbs over the last 9 months, -14% change in wt. Pt is at risk for nutritional compromise d/t recent wt loss and poor intake. Malnutrition Assessment:  · Malnutrition Status: Meets the criteria for severe malnutrition  · Context: Chronic illness  · Findings of the 6 clinical characteristics of malnutrition (Minimum of 2 out of 6 clinical characteristics is required to make the diagnosis of moderate or severe Protein Calorie Malnutrition based on AND/ASPEN Guidelines):  1. Energy Intake-Less than or equal to 75% of estimated energy requirement, Greater than or equal to 3 months    2. Weight Loss-10% loss or greater, (9 months)  3. Fat Loss-No significant subcutaneous fat loss,    4. Muscle Loss-No significant muscle mass loss,    5. Fluid Accumulation-No significant fluid accumulation,    6.  Strength-Not measured    Nutrition Risk Level: High    Nutrient Needs:  · Estimated Daily Total Kcal: 7950-6007  · Estimated Daily Protein (g): 47-71 gms(1.0-1.5 gms)  · Estimated Daily Total Fluid (ml/day): 9695-5990(30-35 ml)    Nutrition Diagnosis:   · Problem: Severe malnutrition  · Etiology: related to Insufficient energy/nutrient consumption     Signs and symptoms:  as evidenced by Weight loss greater than or equal to 10% in 6 months, Diet history of poor intake, Intake 25-50%    Objective Information:  · Nutrition-Focused Physical Findings: No edema.  I/O's: +236  · Wound Type: None  · Current Nutrition Therapies:  · Oral Diet Orders: General   · Oral Diet intake: 26-50%  · Oral Nutrition Supplement (ONS) Orders: None  · Anthropometric Measures:  · Ht: 5' (152.4 cm)   · Current Body Wt: 103 lb (46.7 kg)  · Usual Body Wt: 120 lb (54.4 kg)  · % Weight Change:  ,  -14%  · Ideal Body Wt: 100 lb (45.4 kg), % Ideal Body    · BMI Classification: BMI 18.5 - 24.9 Normal Weight    Nutrition Interventions:   Continue current diet, Start ONS  Continued Inpatient Monitoring, Education Not Indicated    Nutrition Evaluation:   · Evaluation: Goals set   · Goals: PO 50% or more at meals/supp    · Monitoring: Meal Intake, Supplement Intake, Weight      Electronically signed by Mich Granados RD, CNSC, LD on 5/1/19 at 1:21 PM    Contact Number: [phone]

## 2019-05-01 NOTE — PROGRESS NOTES
4 Eyes Skin Assessment     The patient is being assess for  Admission    I agree that 2 RN's have performed a thorough Head to Toe Skin Assessment on the patient. ALL assessment sites listed below have been assessed. Areas assessed by both nurses:   [x]   Head, Face, and Ears   [x]   Shoulders, Back, and Chest  [x]   Arms, Elbows, and Hands   [x]   Coccyx, Sacrum, and IschIum  [x]   Legs, Feet, and Heels        Does the Patient have Skin Breakdown? No.  Pt has lip cracks, left nostril redness, a small bruising area around right knee, and tatooes LE.           Atilio Prevention initiated:  No   Wound Care Orders initiated:  No      WOC nurse consulted for Pressure Injury (Stage 3,4, Unstageable, DTI, NWPT, and Complex wounds), New and Established Ostomies:  No      Nurse 1 eSignature: Electronically signed by Raffaele Obando RN on 4/30/19 at 11:39 PM    **SHARE this note so that the co-signing nurse is able to place an eSignature**    Nurse 2 eSignature: Electronically signed by Angie Donovan RN on 5/1/19 at 12:38 AM

## 2019-05-02 VITALS
WEIGHT: 108.1 LBS | TEMPERATURE: 98.6 F | OXYGEN SATURATION: 96 % | BODY MASS INDEX: 21.22 KG/M2 | RESPIRATION RATE: 14 BRPM | DIASTOLIC BLOOD PRESSURE: 61 MMHG | SYSTOLIC BLOOD PRESSURE: 96 MMHG | HEIGHT: 60 IN | HEART RATE: 75 BPM

## 2019-05-02 LAB
ANION GAP SERPL CALCULATED.3IONS-SCNC: 10 MMOL/L (ref 3–16)
BUN BLDV-MCNC: 10 MG/DL (ref 7–20)
CALCIUM SERPL-MCNC: 7.6 MG/DL (ref 8.3–10.6)
CHLORIDE BLD-SCNC: 103 MMOL/L (ref 99–110)
CO2: 21 MMOL/L (ref 21–32)
CREAT SERPL-MCNC: <0.5 MG/DL (ref 0.6–1.1)
GFR AFRICAN AMERICAN: >60
GFR NON-AFRICAN AMERICAN: >60
GLUCOSE BLD-MCNC: 86 MG/DL (ref 70–99)
HCT VFR BLD CALC: 36.5 % (ref 36–48)
HEMOGLOBIN: 11.9 G/DL (ref 12–16)
MAGNESIUM: 1.9 MG/DL (ref 1.8–2.4)
MCH RBC QN AUTO: 33 PG (ref 26–34)
MCHC RBC AUTO-ENTMCNC: 32.6 G/DL (ref 31–36)
MCV RBC AUTO: 101 FL (ref 80–100)
PDW BLD-RTO: 21.5 % (ref 12.4–15.4)
PHOSPHORUS: 3.4 MG/DL (ref 2.5–4.9)
PLATELET # BLD: 260 K/UL (ref 135–450)
PMV BLD AUTO: 7.3 FL (ref 5–10.5)
POTASSIUM SERPL-SCNC: 4.2 MMOL/L (ref 3.5–5.1)
RBC # BLD: 3.61 M/UL (ref 4–5.2)
SODIUM BLD-SCNC: 134 MMOL/L (ref 136–145)
WBC # BLD: 5.8 K/UL (ref 4–11)

## 2019-05-02 PROCEDURE — 6360000002 HC RX W HCPCS: Performed by: HOSPITALIST

## 2019-05-02 PROCEDURE — 85027 COMPLETE CBC AUTOMATED: CPT

## 2019-05-02 PROCEDURE — 84100 ASSAY OF PHOSPHORUS: CPT

## 2019-05-02 PROCEDURE — 2580000003 HC RX 258: Performed by: HOSPITALIST

## 2019-05-02 PROCEDURE — 96372 THER/PROPH/DIAG INJ SC/IM: CPT

## 2019-05-02 PROCEDURE — 80048 BASIC METABOLIC PNL TOTAL CA: CPT

## 2019-05-02 PROCEDURE — 83735 ASSAY OF MAGNESIUM: CPT

## 2019-05-02 PROCEDURE — 36415 COLL VENOUS BLD VENIPUNCTURE: CPT

## 2019-05-02 PROCEDURE — G0378 HOSPITAL OBSERVATION PER HR: HCPCS

## 2019-05-02 PROCEDURE — 6370000000 HC RX 637 (ALT 250 FOR IP): Performed by: HOSPITALIST

## 2019-05-02 RX ADMIN — ALPRAZOLAM 0.25 MG: 0.25 TABLET ORAL at 13:22

## 2019-05-02 RX ADMIN — HYDROCODONE BITARTRATE AND ACETAMINOPHEN 1 TABLET: 7.5; 325 TABLET ORAL at 13:24

## 2019-05-02 RX ADMIN — GABAPENTIN 600 MG: 300 CAPSULE ORAL at 13:24

## 2019-05-02 RX ADMIN — HYDROCODONE BITARTRATE AND ACETAMINOPHEN 1 TABLET: 7.5; 325 TABLET ORAL at 09:11

## 2019-05-02 RX ADMIN — ENOXAPARIN SODIUM 40 MG: 40 INJECTION SUBCUTANEOUS at 13:24

## 2019-05-02 RX ADMIN — Medication 10 ML: at 09:11

## 2019-05-02 RX ADMIN — GABAPENTIN 600 MG: 300 CAPSULE ORAL at 09:11

## 2019-05-02 ASSESSMENT — PAIN SCALES - GENERAL
PAINLEVEL_OUTOF10: 0
PAINLEVEL_OUTOF10: 0
PAINLEVEL_OUTOF10: 7
PAINLEVEL_OUTOF10: 6

## 2019-05-02 NOTE — DISCHARGE SUMMARY
Hospital Medicine Discharge Summary    Patient: Luciano Aviles     Gender: female  : 1975   Age: 41 y.o. MRN: [id number]    Admitting Physician: Imani Moreno MD  Discharge Physician: Imani Moreno MD     Code Status: Full Code     Admit Date: 2019   Discharge Date:   2019    Disposition:  Home    Discharge Diagnoses: Active Hospital Problems    Diagnosis Date Noted    Severe malnutrition ([facility] [E43] 2019    Depression [F32.9]     [facility] [F19.10]     Anxiety disorder [F41.9]     Methamphetamine intoxication [facility]) [F15.929] 2019       Follow-up appointments:  one month    Condition at Discharge:  [facility] course:     41 y.o. female who has history of chronic pain, seen by pain management at least in the past 3 years presented to [facility] with drug overdose. Patient was extremely agitated and nonverbal upon admission. She reported suicidal thoughts in the emergency room. His urine toxicology screen revealed the patient has positive substances such as amphetamine, cocaine and oxycodone. CT of head did not show any acute intracranial abnormality. Patient admitted under observation Ativan 1 mg every 6 hours as needed started for agitation. The patient also receivedD5 half-normal saline +20 KCl 100 ML per hour order first day. Next day,Patient able to talk, answered questions today, father at the bedside, over the weekend she went to [address] [facility] stay with a friend's house and she used some illicit drugs at that time she doesn't remember how to use exactly she come back on , ,  she got hallucinating started doing the weird things on Tuesday () and she got transferred to the emergency room. This can be interpreted as probablemethamphetamine withdrawal.  Because of suicidal ideation psychiatry service consulted.   It was thought patient does not require inpatient psych admission and also patient desired inpatient substance abuse rehab. The patient to provide a list of Addiction Treatment resources. A referral to [facility] initiated. hey do accept [facility] dual which patient  has, so they are reviewing. On the day of discharge patient stable to go home. She will be discharged in stable condition today. Discharge Medications:   Current Discharge Medication List        Current Discharge Medication List        Current Discharge Medication List      CONTINUE these medications which have NOT CHANGED    Details   cyclobenzaprine (FLEXERIL) 10 MG tablet TK 1 T PO TID  Refills: 1      gabapentin (NEURONTIN) 600 MG tablet TK 1 T PO TID  Refills: 2      butorphanol (STADOL) 10 MG/ML nasal spray SPRAY 1 PUFF IN NOTRIL Q 12 HOURS PRN  Refills: 0      HYDROcodone-acetaminophen (NORCO) 7.5-325 MG per tablet Take 1 tablet by mouth 3 times daily. alprazolam (XANAX) 0.25 MG tablet Take 0.25 mg by mouth 3 times daily as needed. Current Discharge Medication List          Discharge ROS:  A complete review of systems was asked and negative      Discharge Exam:    /73   Pulse 87   Temp 98.3 °F (36.8 °C) (Temporal)   Resp 14   Ht 5' (1.524 m)   Wt 108 lb 1.6 oz (49 kg)   SpO2 97%   BMI 21.11 kg/m²   General appearance:  NAD  HEENT:   Normal cephalic, atraumatic, moist mucous membranes, no oropharyngeal erythema or exudate  Neck: Supple, trachea midline, no anterior cervical or SC LAD  Heart[de-identified] Normal S1/S2, no S3 or S4 RRR, no murmurs or rubs. Lungs:  Clear to auscultation bilaterally, No wheeze, rales or rhonchi noted. Abdomen: Soft, non-tender, non-distended,no organomegaly noted,  bowel sounds present, no masses  Musculoskeletal: Grossly intact,muscle strength equal and moves all four extremities 5/5 strength  Extremities: No clubbing, no cyanosis,no peripheral edema noted  Skin: No lesion or masses  Neurologic:  Neurovascularly intact without any focal sensory/motor deficits.  Cranial nerves: II-XII intact, grossly non-focal.  Psychiatric:  A & O x3        Labs: For convenience and continuity at follow-up the following most recent labs are provided:    Lab Results   Component Value Date    WBC 5.8 05/02/2019    HGB 11.9 05/02/2019    HCT 36.5 05/02/2019    .0 05/02/2019     05/02/2019     05/02/2019    K 4.2 05/02/2019    K 3.6 04/30/2019     05/02/2019    CO2 21 05/02/2019    BUN 10 05/02/2019    CREATININE <0.5 05/02/2019    CALCIUM 7.6 05/02/2019    PHOS 3.4 05/02/2019    ALKPHOS 109 04/30/2019    ALT 21 04/30/2019    AST 23 04/30/2019    BILITOT 1.8 04/30/2019    BILIDIR <0.2 11/28/2016    LABALBU 3.3 04/30/2019     Lab Results   Component Value Date    INR 1.02 08/08/2018    INR 1.04 06/17/2018       Radiology:  Ct Head Wo Contrast    Result Date: 4/30/2019  EXAMINATION: CT OF THE HEAD WITHOUT CONTRAST  4/30/2019 2:39 pm TECHNIQUE: CT of the head was performed without the administration of intravenous contrast. Dose modulation, iterative reconstruction, and/or weight based adjustment of the mA/kV was utilized to reduce the radiation dose to as low as reasonably achievable. COMPARISON: CT head 08/19/2018, 12/09/2018. HISTORY: ORDERING SYSTEM PROVIDED HISTORY: mental status change TECHNOLOGIST PROVIDED HISTORY: Has a \"code stroke\" or \"stroke alert\" been called? ->No Ordering Physician Provided Reason for Exam: Drug Overdose (Pt. comes in by [facility] from home. Pt. reportedly took Meth last night around midnight and then woke up and had track marks on her left hand and right foot. Per family pt. is altered. Pt. stating in triage, \"please get the people in the hallway to leave Acuity: Acute Type of Exam: Initial FINDINGS: BRAIN/VENTRICLES: There is no intracranial hemorrhage. The ventricles and basal cisterns are patent. There is no midline shift or extra-axial collection. There is no acute transcortical infarct.  ORBITS: The visualized portion of the orbits demonstrate no acute abnormality. SINUSES: The visualized paranasal sinuses and mastoid air cells demonstrate no acute abnormality. SOFT TISSUES/SKULL:  There is no depressed calvarial fracture. Redemonstration of a right craniectomy and cranioplasty. No acute intracranial abnormality or significant interval change from prior study 12/09/2018. Xr Chest Portable    Result Date: 5/1/2019  EXAMINATION: SINGLE XRAY VIEW OF THE CHEST 4/30/2019 2:04 pm COMPARISON: 08/08/2018 HISTORY: ORDERING SYSTEM PROVIDED HISTORY: ams TECHNOLOGIST PROVIDED HISTORY: Reason for exam:->ams Ordering Physician Provided Reason for Exam: Altered mental status. Tech helped hold patient for xrays. Acuity: Acute Type of Exam: Initial FINDINGS: The heart size is normal.  There is no pneumothorax, edema or focal consolidation. An age-indeterminate right posterior 3rd rib fracture is noted. This appears old although is new when compared to the 12/09/2018 CT exam.     No evidence of acute cardiopulmonary disease. Right posterior 3rd rib fracture, likely subacute. EKG             The patient was seen and examined on day of discharge and this discharge summary is in conjunction with any daily progress note from day of discharge. Time Spent on discharge is 30 minutes  in the examination, evaluation, counseling and review of medications and discharge plan. Signed:    Komal Smith MD   5/2/2019      Thank you Edward Brunner, MD for the opportunity to be involved in this patient's care.  If you have any questions or concerns please feel free to contact me at

## 2019-05-02 NOTE — CARE COORDINATION
[facility] declined the inpatient  referral for insurance reasons, but would accept as an outpatient. [facility] also declined the referral for inpatient care.

## 2019-05-02 NOTE — PLAN OF CARE
Problem: Pain:  Goal: Pain level will decrease  Description  Pain level will decrease  Outcome: Ongoing   Pt denies any pain at the time. Will continue to monitor pt pain. Problem: Falls - Risk of:  Goal: Will remain free from falls  Description  Will remain free from falls  Outcome: Ongoing   Pt remains free from falls. Pt is asleep in bed with 2/4 side rails up, bed in lowest position with brakes locked and bedside table and call light within reach. Bed alarm on. Will continue to monitor pt. Problem: Suicide risk  Goal: Provide patient with safe environment  Description  Provide patient with safe environment- safety cart outside room- visitors to leave belongings outside room   Outcome: Ongoing   Pt taken off of suicide precautions per order.  Will continue to monitor pt throughout the nights

## 2019-05-02 NOTE — PROGRESS NOTES
Shift assessment complete and morning medications given. Patient is resting in bed at this time. Denies additional needs. Call light is in reach. Will continue to monitor.  Emily Rock RN

## 2019-05-02 NOTE — PROGRESS NOTES
Data- discharge order received, pt verbalized agreement to discharge, disposition to previous residence, no needs for HHC/DME. Action- discharge instructions prepared and given to pt, pt verbalized understanding. Medication information packet given r/t NEW and/or CHANGED prescriptions emphasizing name/purpose/side effects, pt verbalized understanding. Discharge instruction summary: Diet- general, Activity- as tolerated, Primary Care Physician as followsJigar Blackman [phone] f/u appointment scheduled for next weeek, immunizations reviewed and declined, prescription medications filled n/a. Inpatient surgical procedure precautions reviewed: n/a . Response- Pt belongings gathered, IV removed. Disposition is home (no HHC/DME needs), transported with sister, taken to lobby via w/c w/ nurse, no complications.

## 2019-05-02 NOTE — CARE COORDINATION
Met with patient to provide a list of Addiction Treatment resources. Will follow up when medically indicated as time allows. Will initiate a referral to [facility] but they typically do not accept managed medicaid. They do accept [facility] dual which she has, so they are reviewing. Referred to [facility].

## 2020-01-21 ENCOUNTER — HOSPITAL ENCOUNTER (EMERGENCY)
Age: 45
Discharge: HOME | End: 2020-01-21
Payer: COMMERCIAL

## 2020-01-21 ENCOUNTER — APPOINTMENT (OUTPATIENT)
Dept: GENERAL RADIOLOGY | Age: 45
End: 2020-01-21
Payer: COMMERCIAL

## 2020-01-21 VITALS
TEMPERATURE: 98.3 F | DIASTOLIC BLOOD PRESSURE: 76 MMHG | HEIGHT: 60 IN | SYSTOLIC BLOOD PRESSURE: 120 MMHG | WEIGHT: 115 LBS | OXYGEN SATURATION: 98 % | BODY MASS INDEX: 22.58 KG/M2 | HEART RATE: 85 BPM | RESPIRATION RATE: 16 BRPM

## 2020-01-21 LAB
AMPHETAMINE SCREEN, URINE: POSITIVE
BARBITURATE SCREEN URINE: ABNORMAL
BENZODIAZEPINE SCREEN, URINE: ABNORMAL
BILIRUBIN URINE: NEGATIVE
BLOOD, URINE: NEGATIVE
CANNABINOID SCREEN URINE: ABNORMAL
CLARITY: CLEAR
COCAINE METABOLITE SCREEN URINE: ABNORMAL
COLOR: YELLOW
GLUCOSE URINE: NEGATIVE MG/DL
KETONES, URINE: NEGATIVE MG/DL
LEUKOCYTE ESTERASE, URINE: NEGATIVE
Lab: ABNORMAL
METHADONE SCREEN, URINE: ABNORMAL
MICROSCOPIC EXAMINATION: NORMAL
NITRITE, URINE: NEGATIVE
OPIATE SCREEN URINE: ABNORMAL
OXYCODONE URINE: ABNORMAL
PH UA: 6
PH UA: 6 (ref 5–8)
PHENCYCLIDINE SCREEN URINE: ABNORMAL
PROPOXYPHENE SCREEN: ABNORMAL
PROTEIN UA: NEGATIVE MG/DL
SPECIFIC GRAVITY UA: 1.01 (ref 1–1.03)
URINE REFLEX TO CULTURE: NORMAL
URINE TYPE: NORMAL
UROBILINOGEN, URINE: 0.2 E.U./DL

## 2020-01-21 PROCEDURE — 6370000000 HC RX 637 (ALT 250 FOR IP): Performed by: PHYSICIAN ASSISTANT

## 2020-01-21 PROCEDURE — 80307 DRUG TEST PRSMV CHEM ANLYZR: CPT

## 2020-01-21 PROCEDURE — 73564 X-RAY EXAM KNEE 4 OR MORE: CPT

## 2020-01-21 PROCEDURE — 99283 EMERGENCY DEPT VISIT LOW MDM: CPT

## 2020-01-21 PROCEDURE — 81003 URINALYSIS AUTO W/O SCOPE: CPT

## 2020-01-21 RX ORDER — ACETAMINOPHEN 500 MG
1000 TABLET ORAL ONCE
Status: COMPLETED | OUTPATIENT
Start: 2020-01-21 | End: 2020-01-21

## 2020-01-21 RX ADMIN — ACETAMINOPHEN 1000 MG: 500 TABLET, FILM COATED ORAL at 08:20

## 2020-01-21 ASSESSMENT — ENCOUNTER SYMPTOMS
ABDOMINAL PAIN: 0
NAUSEA: 0
RHINORRHEA: 0
SHORTNESS OF BREATH: 0
VOMITING: 0
DIARRHEA: 0
COUGH: 0

## 2020-01-21 ASSESSMENT — PAIN SCALES - GENERAL
PAINLEVEL_OUTOF10: 8
PAINLEVEL_OUTOF10: 8

## 2020-01-21 ASSESSMENT — PAIN DESCRIPTION - ORIENTATION: ORIENTATION: RIGHT

## 2020-01-21 ASSESSMENT — PAIN DESCRIPTION - PAIN TYPE: TYPE: ACUTE PAIN

## 2020-01-21 ASSESSMENT — PAIN DESCRIPTION - LOCATION: LOCATION: KNEE

## 2020-01-21 NOTE — ED NOTES
Pt has scattered bruising to inner thighs. When questioned, pt denies any abuse or potential harm to herself. Pt states she doesn't know where the bruising came from and she just woke up like this. Pt denies alcohol and drug use, but writer noted history of meth use in her chart. Pt appears anxious and is unable to sit still in bed.      Caryle Sales, RN  01/21/20 8134

## 2020-01-21 NOTE — ED PROVIDER NOTES
[facility]        Pt Name: Audra Judge  MRN: [id number]  [place] 1975  Date of evaluation: 1/21/2020  Provider: Obie Vela PA-C  PCP: Pal Beckman MD    This patient was not seen and evaluated by the attending physician but were available for consultation as needed      [facility]       Chief Complaint   Patient presents with    Knee Pain     Pt in by EMS from home. A/ox4, c/o right knee pain and scattered bruising to both thighs. Pt denies injury. HISTORY OF PRESENT ILLNESS   (Location/Symptom, Timing/Onset, Context/Setting, Quality, Duration, Modifying Factors, Severity)  Note limiting factors. Audra Judge is a 45 y.o. female presents to the emergency department today for evaluation for right knee pain. The patient states that she has been cleaning the carpets since Saturday, and she states that she has been noticing right knee pain. The patient denies falling on her knee or injuring her knee in any way, but she states that she has increasing pain, particularly with bending her knee and she states that she will hear a \"clicking\" noise. The patient is currently rating her pain as an 8/10. She is moving her knee without any difficulty on exam.  She denies any numbness, tingling or weakness. No fever chills. No nausea or vomiting. The patient also adds that she has various bruises to her upper thighs, and scattered on her arms. The patient denies having any kidney issues or liver issues that she knows of. She denies any drug use however in her chart it appears that she has used meth in the past.  The patient denies any dysuria hematuria. She has no chest pain, shortness of breath or abdominal pain. Patient still able to ambulate without difficulty.   Otherwise has no complaints, she is not on any blood thinners    Nursing Notes were all reviewed and agreed with or any disagreements were addressed in the HPI. REVIEW OF SYSTEMS    (2-9 systems for level 4, 10 or more for level 5)     Review of Systems   Constitutional: Negative for activity change, appetite change, chills and fever. HENT: Negative for congestion and rhinorrhea. Respiratory: Negative for cough and shortness of breath. Cardiovascular: Negative for chest pain. Gastrointestinal: Negative for abdominal pain, diarrhea, nausea and vomiting. Genitourinary: Negative for difficulty urinating, dysuria and hematuria. Musculoskeletal: Positive for arthralgias. Neurological: Negative for weakness and numbness. Hematological: Bruises/bleeds easily. Positives and Pertinent negatives as per HPI. Except as noted above in the ROS, all other systems were reviewed and negative. PAST MEDICAL HISTORY     Past Medical History:   Diagnosis Date    Backpain     Headache(784.0)     Meningitis due to bacteria     Panic attack     Seizures ([facility]     Thyroid disease          SURGICAL HISTORY     Past Surgical History:   Procedure Laterality Date    BRAIN SURGERY      CHOLECYSTECTOMY      COLONOSCOPY      CRANIOTOMY      DILATION AND CURETTAGE OF UTERUS      UPPER GASTROINTESTINAL ENDOSCOPY           CURRENTMEDICATIONS       Discharge Medication List as of 1/21/2020  9:19 AM      CONTINUE these medications which have NOT CHANGED    Details   cyclobenzaprine (FLEXERIL) 10 MG tablet TK 1 T PO TID, R-1Historical Med      gabapentin (NEURONTIN) 600 MG tablet TK 1 T PO TID, R-2Historical Med      butorphanol (STADOL) 10 MG/ML nasal spray SPRAY 1 PUFF IN NOTRIL Q 12 HOURS PRN, R-0Historical Med      HYDROcodone-acetaminophen (NORCO) 7.5-325 MG per tablet Take 1 tablet by mouth 3 times daily. Historical Med      alprazolam (XANAX) 0.25 MG tablet Take 0.25 mg by mouth 3 times daily as needed. ALLERGIES     Fioricet [butalbital-apap-caffeine]; Phenytoin sodium extended; Reglan [metoclopramide];  Toradol [ketorolac medications:  Medications   acetaminophen (TYLENOL) tablet 1,000 mg (1,000 mg Oral Given 1/21/20 0820)       The patient presents to the emergency department today for evaluation for right knee pain. The patient states that she has been cleaning the carpets since Saturday, and she states that she has been noticing right knee pain. The patient denies falling on her knee or injuring her knee in any way, but she states that she has increasing pain, particularly with bending her knee and she states that she will hear a \"clicking\" noise. The patient is currently rating her pain as an 8/10. She is moving her knee without any difficulty on exam.  She denies any numbness, tingling or weakness. No fever chills. No nausea or vomiting. The patient also adds that she has various bruises to her upper thighs, and scattered on her arms. The patient denies having any kidney issues or liver issues that she knows of. She denies any drug use however in her chart it appears that she has used meth in the past.  The patient denies any dysuria hematuria. She has no chest pain, shortness of breath or abdominal pain. Patient still able to ambulate without difficulty. Otherwise has no complaints, she is not on any blood thinners    Physical exam, she does have diffuse tenderness over her right knee, she is neurovascularly intact. She does have scattered bruising to her upper thighs, and arms,*I did recommend lab work, the RN attempted to do a blood draw however the patient stated that she did not want her blood drawn. I did again recommend that she have the lab work done, but she states that she will have her follow-up with her primary care physician for this. Her x-ray is unremarkable.   Her knee pain could certainly be due to contusion or possibly sprain/strain    Although the patient denies any drug use to me her drug screen is positive for amphetamines, and I did tell the patient that when she is under the influence
